# Patient Record
Sex: FEMALE | Race: WHITE | NOT HISPANIC OR LATINO | URBAN - METROPOLITAN AREA
[De-identification: names, ages, dates, MRNs, and addresses within clinical notes are randomized per-mention and may not be internally consistent; named-entity substitution may affect disease eponyms.]

---

## 2017-04-07 ENCOUNTER — INPATIENT (INPATIENT)
Facility: HOSPITAL | Age: 67
LOS: 4 days | Discharge: NURSING, REHAB OR LONG-TERM CARE | DRG: 194 | End: 2017-04-12
Attending: INTERNAL MEDICINE | Admitting: INTERNAL MEDICINE
Payer: MEDICARE

## 2017-04-07 VITALS
RESPIRATION RATE: 16 BRPM | HEIGHT: 62 IN | HEART RATE: 78 BPM | OXYGEN SATURATION: 98 % | SYSTOLIC BLOOD PRESSURE: 127 MMHG | DIASTOLIC BLOOD PRESSURE: 78 MMHG | WEIGHT: 139.99 LBS

## 2017-04-07 DIAGNOSIS — N30.00 ACUTE CYSTITIS WITHOUT HEMATURIA: ICD-10-CM

## 2017-04-07 DIAGNOSIS — I21.4 NON-ST ELEVATION (NSTEMI) MYOCARDIAL INFARCTION: ICD-10-CM

## 2017-04-07 DIAGNOSIS — J44.9 CHRONIC OBSTRUCTIVE PULMONARY DISEASE, UNSPECIFIED: ICD-10-CM

## 2017-04-07 DIAGNOSIS — Z29.9 ENCOUNTER FOR PROPHYLACTIC MEASURES, UNSPECIFIED: ICD-10-CM

## 2017-04-07 DIAGNOSIS — J18.1 LOBAR PNEUMONIA, UNSPECIFIED ORGANISM: ICD-10-CM

## 2017-04-07 DIAGNOSIS — E03.9 HYPOTHYROIDISM, UNSPECIFIED: ICD-10-CM

## 2017-04-07 DIAGNOSIS — F20.9 SCHIZOPHRENIA, UNSPECIFIED: ICD-10-CM

## 2017-04-07 LAB
ALBUMIN SERPL ELPH-MCNC: 2.7 G/DL — LOW (ref 3.5–5)
ALLERGY+IMMUNOLOGY DIAG STUDY NOTE: SIGNIFICANT CHANGE UP
ALP SERPL-CCNC: 59 U/L — SIGNIFICANT CHANGE UP (ref 40–120)
ALT FLD-CCNC: 22 U/L DA — SIGNIFICANT CHANGE UP (ref 10–60)
ANION GAP SERPL CALC-SCNC: 8 MMOL/L — SIGNIFICANT CHANGE UP (ref 5–17)
APPEARANCE UR: ABNORMAL
APTT BLD: 40.2 SEC — HIGH (ref 27.5–37.4)
AST SERPL-CCNC: 33 U/L — SIGNIFICANT CHANGE UP (ref 10–40)
BASOPHILS # BLD AUTO: 0.1 K/UL — SIGNIFICANT CHANGE UP (ref 0–0.2)
BASOPHILS NFR BLD AUTO: 1.8 % — SIGNIFICANT CHANGE UP (ref 0–2)
BILIRUB SERPL-MCNC: 0.2 MG/DL — SIGNIFICANT CHANGE UP (ref 0.2–1.2)
BILIRUB UR-MCNC: NEGATIVE — SIGNIFICANT CHANGE UP
BUN SERPL-MCNC: 20 MG/DL — HIGH (ref 7–18)
CALCIUM SERPL-MCNC: 8.1 MG/DL — LOW (ref 8.4–10.5)
CHLORIDE SERPL-SCNC: 109 MMOL/L — HIGH (ref 96–108)
CO2 SERPL-SCNC: 27 MMOL/L — SIGNIFICANT CHANGE UP (ref 22–31)
COLOR SPEC: YELLOW — SIGNIFICANT CHANGE UP
CREAT SERPL-MCNC: 0.73 MG/DL — SIGNIFICANT CHANGE UP (ref 0.5–1.3)
DIFF PNL FLD: ABNORMAL
EOSINOPHIL # BLD AUTO: 0.1 K/UL — SIGNIFICANT CHANGE UP (ref 0–0.5)
EOSINOPHIL NFR BLD AUTO: 1.3 % — SIGNIFICANT CHANGE UP (ref 0–6)
FLUBV RNA SPEC QL NAA+PROBE: DETECTED
GLUCOSE SERPL-MCNC: 105 MG/DL — HIGH (ref 70–99)
GLUCOSE UR QL: NEGATIVE — SIGNIFICANT CHANGE UP
HCT VFR BLD CALC: 34.2 % — LOW (ref 34.5–45)
HGB BLD-MCNC: 11.9 G/DL — SIGNIFICANT CHANGE UP (ref 11.5–15.5)
INR BLD: 1.25 RATIO — HIGH (ref 0.88–1.16)
KETONES UR-MCNC: ABNORMAL
LACTATE SERPL-SCNC: 1.5 MMOL/L — SIGNIFICANT CHANGE UP (ref 0.7–2)
LEUKOCYTE ESTERASE UR-ACNC: ABNORMAL
LYMPHOCYTES # BLD AUTO: 1.1 K/UL — SIGNIFICANT CHANGE UP (ref 1–3.3)
LYMPHOCYTES # BLD AUTO: 15.7 % — SIGNIFICANT CHANGE UP (ref 13–44)
MCHC RBC-ENTMCNC: 32.6 PG — SIGNIFICANT CHANGE UP (ref 27–34)
MCHC RBC-ENTMCNC: 34.8 GM/DL — SIGNIFICANT CHANGE UP (ref 32–36)
MCV RBC AUTO: 93.7 FL — SIGNIFICANT CHANGE UP (ref 80–100)
MONOCYTES # BLD AUTO: 1.1 K/UL — HIGH (ref 0–0.9)
MONOCYTES NFR BLD AUTO: 15.5 % — HIGH (ref 2–14)
NEUTROPHILS # BLD AUTO: 4.7 K/UL — SIGNIFICANT CHANGE UP (ref 1.8–7.4)
NEUTROPHILS NFR BLD AUTO: 65.8 % — SIGNIFICANT CHANGE UP (ref 43–77)
NITRITE UR-MCNC: POSITIVE
PH UR: 6 — SIGNIFICANT CHANGE UP (ref 4.8–8)
PLATELET # BLD AUTO: 174 K/UL — SIGNIFICANT CHANGE UP (ref 150–400)
POTASSIUM SERPL-MCNC: 4.2 MMOL/L — SIGNIFICANT CHANGE UP (ref 3.5–5.3)
POTASSIUM SERPL-SCNC: 4.2 MMOL/L — SIGNIFICANT CHANGE UP (ref 3.5–5.3)
PROT SERPL-MCNC: 6.5 G/DL — SIGNIFICANT CHANGE UP (ref 6–8.3)
PROT UR-MCNC: 100
PROTHROM AB SERPL-ACNC: 13.7 SEC — HIGH (ref 9.8–12.7)
RAPID RVP RESULT: DETECTED
RBC # BLD: 3.65 M/UL — LOW (ref 3.8–5.2)
RBC # FLD: 13.1 % — SIGNIFICANT CHANGE UP (ref 10.3–14.5)
SODIUM SERPL-SCNC: 144 MMOL/L — SIGNIFICANT CHANGE UP (ref 135–145)
SP GR SPEC: 1.02 — SIGNIFICANT CHANGE UP (ref 1.01–1.02)
UROBILINOGEN FLD QL: NEGATIVE — SIGNIFICANT CHANGE UP
WBC # BLD: 7.1 K/UL — SIGNIFICANT CHANGE UP (ref 3.8–10.5)
WBC # FLD AUTO: 7.1 K/UL — SIGNIFICANT CHANGE UP (ref 3.8–10.5)

## 2017-04-07 PROCEDURE — 71010: CPT | Mod: 26

## 2017-04-07 PROCEDURE — 99285 EMERGENCY DEPT VISIT HI MDM: CPT

## 2017-04-07 RX ORDER — ACETAMINOPHEN 500 MG
650 TABLET ORAL ONCE
Qty: 0 | Refills: 0 | Status: COMPLETED | OUTPATIENT
Start: 2017-04-07 | End: 2017-04-07

## 2017-04-07 RX ORDER — AZITHROMYCIN 500 MG/1
500 TABLET, FILM COATED ORAL ONCE
Qty: 0 | Refills: 0 | Status: COMPLETED | OUTPATIENT
Start: 2017-04-07 | End: 2017-04-07

## 2017-04-07 RX ORDER — HEPARIN SODIUM 5000 [USP'U]/ML
INJECTION INTRAVENOUS; SUBCUTANEOUS
Qty: 25000 | Refills: 0 | Status: DISCONTINUED | OUTPATIENT
Start: 2017-04-07 | End: 2017-04-08

## 2017-04-07 RX ORDER — SODIUM CHLORIDE 9 MG/ML
1000 INJECTION INTRAMUSCULAR; INTRAVENOUS; SUBCUTANEOUS
Qty: 0 | Refills: 0 | Status: DISCONTINUED | OUTPATIENT
Start: 2017-04-07 | End: 2017-04-08

## 2017-04-07 RX ORDER — HEPARIN SODIUM 5000 [USP'U]/ML
3800 INJECTION INTRAVENOUS; SUBCUTANEOUS ONCE
Qty: 0 | Refills: 0 | Status: COMPLETED | OUTPATIENT
Start: 2017-04-07 | End: 2017-04-07

## 2017-04-07 RX ORDER — ATORVASTATIN CALCIUM 80 MG/1
40 TABLET, FILM COATED ORAL AT BEDTIME
Qty: 0 | Refills: 0 | Status: DISCONTINUED | OUTPATIENT
Start: 2017-04-07 | End: 2017-04-12

## 2017-04-07 RX ORDER — ASPIRIN/CALCIUM CARB/MAGNESIUM 324 MG
81 TABLET ORAL DAILY
Qty: 0 | Refills: 0 | Status: DISCONTINUED | OUTPATIENT
Start: 2017-04-07 | End: 2017-04-12

## 2017-04-07 RX ORDER — DIVALPROEX SODIUM 500 MG/1
1500 TABLET, DELAYED RELEASE ORAL DAILY
Qty: 0 | Refills: 0 | Status: DISCONTINUED | OUTPATIENT
Start: 2017-04-07 | End: 2017-04-08

## 2017-04-07 RX ORDER — MAGNESIUM HYDROXIDE 400 MG/1
15 TABLET, CHEWABLE ORAL AT BEDTIME
Qty: 0 | Refills: 0 | Status: DISCONTINUED | OUTPATIENT
Start: 2017-04-07 | End: 2017-04-12

## 2017-04-07 RX ORDER — CEFTRIAXONE 500 MG/1
1 INJECTION, POWDER, FOR SOLUTION INTRAMUSCULAR; INTRAVENOUS ONCE
Qty: 0 | Refills: 0 | Status: COMPLETED | OUTPATIENT
Start: 2017-04-07 | End: 2017-04-07

## 2017-04-07 RX ORDER — SIMVASTATIN 20 MG/1
20 TABLET, FILM COATED ORAL AT BEDTIME
Qty: 0 | Refills: 0 | Status: DISCONTINUED | OUTPATIENT
Start: 2017-04-07 | End: 2017-04-07

## 2017-04-07 RX ORDER — SODIUM CHLORIDE 9 MG/ML
1000 INJECTION, SOLUTION INTRAVENOUS
Qty: 0 | Refills: 0 | Status: DISCONTINUED | OUTPATIENT
Start: 2017-04-07 | End: 2017-04-07

## 2017-04-07 RX ORDER — OLANZAPINE 15 MG/1
1 TABLET, FILM COATED ORAL
Qty: 0 | Refills: 0 | COMMUNITY

## 2017-04-07 RX ORDER — PIPERACILLIN AND TAZOBACTAM 4; .5 G/20ML; G/20ML
3.38 INJECTION, POWDER, LYOPHILIZED, FOR SOLUTION INTRAVENOUS EVERY 8 HOURS
Qty: 0 | Refills: 0 | Status: DISCONTINUED | OUTPATIENT
Start: 2017-04-07 | End: 2017-04-12

## 2017-04-07 RX ORDER — DIPHENHYDRAMINE HCL 50 MG
25 CAPSULE ORAL ONCE
Qty: 0 | Refills: 0 | Status: COMPLETED | OUTPATIENT
Start: 2017-04-07 | End: 2017-04-07

## 2017-04-07 RX ORDER — METOPROLOL TARTRATE 50 MG
12.5 TABLET ORAL
Qty: 0 | Refills: 0 | Status: DISCONTINUED | OUTPATIENT
Start: 2017-04-07 | End: 2017-04-08

## 2017-04-07 RX ORDER — LEVOTHYROXINE SODIUM 125 MCG
50 TABLET ORAL DAILY
Qty: 0 | Refills: 0 | Status: DISCONTINUED | OUTPATIENT
Start: 2017-04-07 | End: 2017-04-12

## 2017-04-07 RX ORDER — OLANZAPINE 15 MG/1
0.5 TABLET, FILM COATED ORAL
Qty: 0 | Refills: 0 | COMMUNITY

## 2017-04-07 RX ORDER — OLANZAPINE 15 MG/1
10 TABLET, FILM COATED ORAL AT BEDTIME
Qty: 0 | Refills: 0 | Status: DISCONTINUED | OUTPATIENT
Start: 2017-04-07 | End: 2017-04-08

## 2017-04-07 RX ORDER — CLONAZEPAM 1 MG
1 TABLET ORAL THREE TIMES A DAY
Qty: 0 | Refills: 0 | Status: DISCONTINUED | OUTPATIENT
Start: 2017-04-07 | End: 2017-04-12

## 2017-04-07 RX ORDER — SODIUM CHLORIDE 9 MG/ML
1000 INJECTION INTRAMUSCULAR; INTRAVENOUS; SUBCUTANEOUS ONCE
Qty: 0 | Refills: 0 | Status: COMPLETED | OUTPATIENT
Start: 2017-04-07 | End: 2017-04-07

## 2017-04-07 RX ORDER — HEPARIN SODIUM 5000 [USP'U]/ML
3800 INJECTION INTRAVENOUS; SUBCUTANEOUS EVERY 6 HOURS
Qty: 0 | Refills: 0 | Status: DISCONTINUED | OUTPATIENT
Start: 2017-04-07 | End: 2017-04-09

## 2017-04-07 RX ORDER — IPRATROPIUM/ALBUTEROL SULFATE 18-103MCG
3 AEROSOL WITH ADAPTER (GRAM) INHALATION EVERY 6 HOURS
Qty: 0 | Refills: 0 | Status: DISCONTINUED | OUTPATIENT
Start: 2017-04-07 | End: 2017-04-12

## 2017-04-07 RX ORDER — VANCOMYCIN HCL 1 G
1000 VIAL (EA) INTRAVENOUS ONCE
Qty: 0 | Refills: 0 | Status: COMPLETED | OUTPATIENT
Start: 2017-04-07 | End: 2017-04-07

## 2017-04-07 RX ORDER — ACETAMINOPHEN 500 MG
650 TABLET ORAL EVERY 6 HOURS
Qty: 0 | Refills: 0 | Status: DISCONTINUED | OUTPATIENT
Start: 2017-04-07 | End: 2017-04-12

## 2017-04-07 RX ORDER — LACTULOSE 10 G/15ML
20 SOLUTION ORAL DAILY
Qty: 0 | Refills: 0 | Status: DISCONTINUED | OUTPATIENT
Start: 2017-04-07 | End: 2017-04-12

## 2017-04-07 RX ADMIN — AZITHROMYCIN 250 MILLIGRAM(S): 500 TABLET, FILM COATED ORAL at 20:47

## 2017-04-07 RX ADMIN — Medication 650 MILLIGRAM(S): at 19:33

## 2017-04-07 RX ADMIN — SODIUM CHLORIDE 250 MILLILITER(S): 9 INJECTION INTRAMUSCULAR; INTRAVENOUS; SUBCUTANEOUS at 18:59

## 2017-04-07 RX ADMIN — CEFTRIAXONE 100 GRAM(S): 500 INJECTION, POWDER, FOR SOLUTION INTRAMUSCULAR; INTRAVENOUS at 19:52

## 2017-04-07 RX ADMIN — Medication 25 MILLIGRAM(S): at 20:12

## 2017-04-07 RX ADMIN — Medication 250 MILLIGRAM(S): at 21:26

## 2017-04-07 RX ADMIN — HEPARIN SODIUM 3800 UNIT(S): 5000 INJECTION INTRAVENOUS; SUBCUTANEOUS at 21:49

## 2017-04-07 RX ADMIN — HEPARIN SODIUM 750 UNIT(S)/HR: 5000 INJECTION INTRAVENOUS; SUBCUTANEOUS at 21:49

## 2017-04-07 NOTE — ED ADULT NURSE NOTE - ED STAT RN HANDOFF DETAILS
pt is alert awake confused and agitated no acute respiratory distress noted endorsed to Chip RN in ED

## 2017-04-07 NOTE — ED PROVIDER NOTE - MEDICAL DECISION MAKING DETAILS
67 y/o F with multiple medical problems, sent here from [facility], here with fever 102 degrees Farenheit rectally. Will do infectious work up. Most likely to admit.

## 2017-04-07 NOTE — ED PROVIDER NOTE - CARE PLAN
Principal Discharge DX:	Pneumonia of right lower lobe due to infectious organism  Secondary Diagnosis:	Acute cystitis without hematuria

## 2017-04-07 NOTE — H&P ADULT. - NEGATIVE NEUROLOGICAL SYMPTOMS
no syncope/no tremors/no vertigo/no generalized seizures/no loss of sensation/no weakness/no loss of consciousness/no transient paralysis

## 2017-04-07 NOTE — ED PROVIDER NOTE - PMH
Anemia    Bipolar Disorder    COPD (chronic obstructive pulmonary disease)    GERD (gastroesophageal reflux disease)    Hypercholesteremia    Hypothyroidism    Osteopenia    Pacemaker    Paranoia  CHRONIC  Schizophrenia

## 2017-04-07 NOTE — ED PROVIDER NOTE - OBJECTIVE STATEMENT
65 y/o F with PMHx of anemia, bipolar disorder, COPD, GERD, HLD, hypothyroidism, osteopenia, pacemaker, chronic paranoia, and schizophrenia, sent from [facility]  for fever and chest pain, although pt denies chest pain in ED. Pt is a poor historian; pt is able to answer yes or no. Pt denies chills, chest pain, trauma, or any other complaints. NKDA. 67 y/o F with PMHx of anemia, bipolar disorder, COPD, GERD, HLD, hypothyroidism, osteopenia, pacemaker, chronic paranoia, and schizophrenia, sent from [facility]  for complaints of fever and chest pain, although pt denies chest pain in ED. Pt is a poor historian; pt is able to answer yes or no. Pt denies chills, chest pain, trauma, nausea, vomiting, diarrhea, abd pain, weakness or any other complaints. Pt continues to report she wants to go home but has multiple psych disorders and not able to make decisions on her own.

## 2017-04-07 NOTE — ED ADULT NURSE NOTE - ED STAT RN HANDOFF DETAILS 2
pt endorsed by SREE ZHENG in stable condition , two IV accesses - right and left arms 20 gauge with NS running

## 2017-04-07 NOTE — H&P ADULT. - PROBLEM SELECTOR PLAN 5
Pt is on olanzapine bid, klonopin and divalproex  QTc is prolonged 592 msec, but pt is very agitated and yelling sawing she wants to go home, giving bedtime doses and will call Psych consult in AM to adjust medications.

## 2017-04-07 NOTE — H&P ADULT. - RS GEN PE MLT RESP DETAILS PC
no rhonchi/no chest wall tenderness/normal/no wheezes/breath sounds equal/clear to auscultation bilaterally/no rales

## 2017-04-07 NOTE — H&P ADULT. - HISTORY OF PRESENT ILLNESS
65 y/o F with PMHx of anemia, bipolar disorder, COPD, GERD, HLD, hypothyroidism, osteopenia, pacemaker, chronic paranoia, and schizophrenia, sent from [facility]  for complaints of fever and chest pain, although pt denies chest pain in ED. Pt is a poor historian; pt is able to answer yes or no. Pt denies chills, chest pain, trauma, nausea, vomiting, diarrhea, abd pain, weakness or any other complaints. Pt continues to report she wants to go home but has multiple psych disorders and not able to make decisions on her own. 67 y/o F with PMHx of anemia, bipolar disorder, COPD, GERD, HLD, hypothyroidism, osteopenia, pacemaker, chronic paranoia, and schizophrenia, sent from [facility]  for complaints of fever and chest pain, although pt denies chest pain in ED. Pt is a poor historian; pt is able to answer yes or no. Pt denies chills, trauma, nausea, vomiting, diarrhea, abd pain, weakness or any other complaints. Pt continues to report she wants to go home but has multiple psych disorders and not able to make decisions on her own. 65 y/o F from [facility] with PMHx of anemia, bipolar disorder, COPD, GERD, HLD, hypothyroidism, osteopenia, pacemaker, chronic paranoia, and schizophrenia, Iron deificiency Anemia, depression, Dementia with behavioural problem, sent from [facility]  for complaints of fever, cough and chest pain, although pt denies chest pain in ED. Pt is a poor historian; pt is able to answer yes or no. Pt denies chest pain, SOB, chills, trauma, nausea, vomiting, diarrhea, abd pain, weakness or any other complaints currently. Pt continues to report she wants to go home but has multiple psych disorders and not able to make decisions on her own.  Pt is full code.

## 2017-04-07 NOTE — ED ADULT NURSE NOTE - ED STAT RN HANDOFF DETAILS 3
received pt on stretcher no respiratory distress mittens in place for safety, IV fluid in progress, admitted awaiting for bed assignment, monitoring continues.

## 2017-04-07 NOTE — ED PROVIDER NOTE - PHYSICAL EXAMINATION
GENERAL: No acute distress, non toxic  HEAD: Atraumatic, normocephalic  EARS: Externally normal, atraumatic, TMs normal bilaterally  EYES: No jaundice, not injected, no rupture, no foreign bodies  MOUTH: Moist mucous membranes, no open lesion, uvula midline without edema, no exudates, no peritonsilar abscess bilaterally.  NECK: Supple, full range of motion, no swelling, no lymphadenopathy  HEART: Regular rate and rhythm, no murmurs, no rubs, no gallops  LUNGS: Clear to auscultation bilaterally without rhonci, rales, or wheezing  ABDOMEN: Soft and non tender in all 4 quadrants, normal bowel sounds, no signs of trauma, no costovertebral tenderness bilaterally  BACK/SPINE: Non tender spine in cervical/thoracic/lumbar regions, no stepoffs palpable  EXTREMITIES: No gross deformities  VASCULAR: Pulses palpable in all extremities, no pitting edema, capillary refill <2 secs  SKIN: Grossly intact without rash or open wounds  PSYCH: Alert and oriented x 3  GAIT: Normal without need for assistance GENERAL: No acute distress, non toxic  HEAD: Atraumatic, normocephalic  EARS: Externally normal, atraumatic  EYES: No jaundice, not injected, no rupture, no foreign bodies  MOUTH: Moist mucous membranes, no open lesion, uvula midline without edema, no exudates, no peritonsilar abscess bilaterally.  NECK: Supple, full range of motion, no swelling, no lymphadenopathy  HEART: Regular rate and rhythm, no murmurs, no rubs, no gallops  LUNGS: Clear to auscultation bilaterally without rhonci, rales, or wheezing  ABDOMEN: Soft and non tender in all 4 quadrants, normal bowel sounds, no signs of trauma, no costovertebral tenderness bilaterally  BACK/SPINE: Non tender spine in cervical/thoracic/lumbar regions, no stepoffs palpable  EXTREMITIES: No gross deformities  VASCULAR: Pulses palpable in all extremities, no pitting edema, capillary refill <2 secs  SKIN: Grossly intact without rash or open wounds  PSYCH: Alert and oriented x 2

## 2017-04-07 NOTE — H&P ADULT. - PROBLEM SELECTOR PLAN 2
Pt has Chest pain, resolved currently likely due to NSTEMI  as T1 elevated 0.116 and risk factors.  EKG Sinus rhythm with first degree AV block @70 bpm with QTc prolonged 590 msec  Heparin drip started in ED as per Attending  f/u T2 and f/u T3   Continue metoprolol, aspirin and statin.  Follow ECHO   Cardiology Consult: Dr Ponce consulted

## 2017-04-07 NOTE — ED ADULT NURSE NOTE - OBJECTIVE STATEMENT
pt biba from NH c/o of chest pain pt is alert awake oriented to self only denies any chest pain at this time pt is agitated yelling out "I want to go back to NH" core temp 102.6 no acute respiratory distress noted

## 2017-04-07 NOTE — ED PROVIDER NOTE - PROGRESS NOTE DETAILS
Pt is trying to pull out her IV lines, will place mittens. Tried to talk to pt but continues to report she wants to go home and trying to pull out lines despite me telling her that she has both UTI and PNA. POS Trop, explained to pt. Spoke to Dr Pineda, he will speak to cardiology. Will start heparin drip in ED. MAR made aware

## 2017-04-07 NOTE — ED PROVIDER NOTE - NS ED MD SCRIBE ATTENDING SCRIBE SECTIONS
REVIEW OF SYSTEMS/VITAL SIGNS( Pullset)/PAST MEDICAL/SURGICAL/SOCIAL HISTORY/HIV/HISTORY OF PRESENT ILLNESS/DISPOSITION/PHYSICAL EXAM

## 2017-04-07 NOTE — ED PROVIDER NOTE - DIAGNOSIS COUNSELING, MDM
conducted a detailed discussion... I had a detailed discussion with the patient and/or guardian regarding the historical points, exam findings, and any diagnostic results supporting the discharge/admit diagnosis. I had a detailed discussion with the patient and/or guardian regarding the historical points, exam findings, and any diagnostic results supporting the admit diagnosis.

## 2017-04-07 NOTE — H&P ADULT. - ASSESSMENT
65 y/o F from [facility] with PMHx of anemia, bipolar disorder, COPD, GERD, HLD, hypothyroidism, osteopenia, pacemaker, chronic paranoia, and schizophrenia, Iron deificiency Anemia, depression, Dementia with behavioural problem, sent from [facility]  for complaints of fever, cough and chest pain, although pt denies chest pain in ED. Pt is a poor historian; pt is able to answer yes or no. Pt denies chest pain, SOB, chills, trauma, nausea, vomiting, diarrhea, abd pain, weakness or any other complaints currently. Pt continues to report she wants to go home but has multiple psych disorders and not able to make decisions on her own.  Pt is full code.

## 2017-04-07 NOTE — H&P ADULT. - PROBLEM SELECTOR PLAN 1
Pt has Fever 101 F, productive cough and CXR showed RLL opacity due to HCAP as from Nursing Home with fever.  will give Zosyn  ID Dr. Segovia consulted  f/u blood cx and urine cx  f/u RVP  duonebs

## 2017-04-08 LAB
ANION GAP SERPL CALC-SCNC: 7 MMOL/L — SIGNIFICANT CHANGE UP (ref 5–17)
APTT BLD: 71.9 SEC — HIGH (ref 27.5–37.4)
BUN SERPL-MCNC: 11 MG/DL — SIGNIFICANT CHANGE UP (ref 7–18)
CALCIUM SERPL-MCNC: 7.2 MG/DL — LOW (ref 8.4–10.5)
CHLORIDE SERPL-SCNC: 116 MMOL/L — HIGH (ref 96–108)
CHOLEST SERPL-MCNC: 86 MG/DL — SIGNIFICANT CHANGE UP (ref 10–199)
CK MB BLD-MCNC: 0.7 % — SIGNIFICANT CHANGE UP (ref 0–3.5)
CK MB BLD-MCNC: 0.8 % — SIGNIFICANT CHANGE UP (ref 0–3.5)
CK MB CFR SERPL CALC: 7.3 NG/ML — HIGH (ref 0–3.6)
CK MB CFR SERPL CALC: 8.1 NG/ML — HIGH (ref 0–3.6)
CK SERPL-CCNC: 981 U/L — HIGH (ref 21–215)
CK SERPL-CCNC: 999 U/L — HIGH (ref 21–215)
CO2 SERPL-SCNC: 24 MMOL/L — SIGNIFICANT CHANGE UP (ref 22–31)
CREAT SERPL-MCNC: 0.6 MG/DL — SIGNIFICANT CHANGE UP (ref 0.5–1.3)
CULTURE RESULTS: NO GROWTH — SIGNIFICANT CHANGE UP
FOLATE SERPL-MCNC: 13.7 NG/ML — SIGNIFICANT CHANGE UP (ref 4.8–24.2)
GLUCOSE SERPL-MCNC: 85 MG/DL — SIGNIFICANT CHANGE UP (ref 70–99)
HBA1C BLD-MCNC: 5.3 % — SIGNIFICANT CHANGE UP (ref 4–5.6)
HCT VFR BLD CALC: 30.4 % — LOW (ref 34.5–45)
HDLC SERPL-MCNC: 42 MG/DL — SIGNIFICANT CHANGE UP (ref 40–125)
HGB BLD-MCNC: 10.2 G/DL — LOW (ref 11.5–15.5)
LIPID PNL WITH DIRECT LDL SERPL: 24 MG/DL — SIGNIFICANT CHANGE UP
MAGNESIUM SERPL-MCNC: 1.7 MG/DL — LOW (ref 1.8–2.4)
MCHC RBC-ENTMCNC: 31.6 PG — SIGNIFICANT CHANGE UP (ref 27–34)
MCHC RBC-ENTMCNC: 33.6 GM/DL — SIGNIFICANT CHANGE UP (ref 32–36)
MCV RBC AUTO: 94.1 FL — SIGNIFICANT CHANGE UP (ref 80–100)
PHOSPHATE SERPL-MCNC: 2.7 MG/DL — SIGNIFICANT CHANGE UP (ref 2.5–4.5)
PLATELET # BLD AUTO: 150 K/UL — SIGNIFICANT CHANGE UP (ref 150–400)
POTASSIUM SERPL-MCNC: 3.8 MMOL/L — SIGNIFICANT CHANGE UP (ref 3.5–5.3)
POTASSIUM SERPL-SCNC: 3.8 MMOL/L — SIGNIFICANT CHANGE UP (ref 3.5–5.3)
RBC # BLD: 3.24 M/UL — LOW (ref 3.8–5.2)
RBC # FLD: 13.1 % — SIGNIFICANT CHANGE UP (ref 10.3–14.5)
SODIUM SERPL-SCNC: 147 MMOL/L — HIGH (ref 135–145)
SPECIMEN SOURCE: SIGNIFICANT CHANGE UP
TOTAL CHOLESTEROL/HDL RATIO MEASUREMENT: 2 RATIO — LOW (ref 3.3–7.1)
TRIGL SERPL-MCNC: 99 MG/DL — SIGNIFICANT CHANGE UP (ref 10–149)
TROPONIN I SERPL-MCNC: 0.02 NG/ML — SIGNIFICANT CHANGE UP (ref 0–0.04)
TROPONIN I SERPL-MCNC: <0.015 NG/ML — SIGNIFICANT CHANGE UP (ref 0–0.04)
VIT B12 SERPL-MCNC: 650 PG/ML — SIGNIFICANT CHANGE UP (ref 243–894)
WBC # BLD: 10.4 K/UL — SIGNIFICANT CHANGE UP (ref 3.8–10.5)
WBC # FLD AUTO: 10.4 K/UL — SIGNIFICANT CHANGE UP (ref 3.8–10.5)

## 2017-04-08 RX ORDER — VANCOMYCIN HCL 1 G
1000 VIAL (EA) INTRAVENOUS EVERY 12 HOURS
Qty: 0 | Refills: 0 | Status: DISCONTINUED | OUTPATIENT
Start: 2017-04-08 | End: 2017-04-08

## 2017-04-08 RX ORDER — VALPROIC ACID (AS SODIUM SALT) 250 MG/5ML
500 SOLUTION, ORAL ORAL EVERY 8 HOURS
Qty: 0 | Refills: 0 | Status: DISCONTINUED | OUTPATIENT
Start: 2017-04-08 | End: 2017-04-12

## 2017-04-08 RX ORDER — SODIUM CHLORIDE 9 MG/ML
1000 INJECTION INTRAMUSCULAR; INTRAVENOUS; SUBCUTANEOUS
Qty: 0 | Refills: 0 | Status: DISCONTINUED | OUTPATIENT
Start: 2017-04-08 | End: 2017-04-12

## 2017-04-08 RX ORDER — SODIUM CHLORIDE 9 MG/ML
1000 INJECTION INTRAMUSCULAR; INTRAVENOUS; SUBCUTANEOUS ONCE
Qty: 0 | Refills: 0 | Status: COMPLETED | OUTPATIENT
Start: 2017-04-08 | End: 2017-04-08

## 2017-04-08 RX ORDER — HEPARIN SODIUM 5000 [USP'U]/ML
5000 INJECTION INTRAVENOUS; SUBCUTANEOUS EVERY 8 HOURS
Qty: 0 | Refills: 0 | Status: DISCONTINUED | OUTPATIENT
Start: 2017-04-08 | End: 2017-04-12

## 2017-04-08 RX ORDER — ACETYLCYSTEINE 200 MG/ML
4 VIAL (ML) MISCELLANEOUS ONCE
Qty: 0 | Refills: 0 | Status: COMPLETED | OUTPATIENT
Start: 2017-04-08 | End: 2017-04-08

## 2017-04-08 RX ORDER — TRAZODONE HCL 50 MG
50 TABLET ORAL THREE TIMES A DAY
Qty: 0 | Refills: 0 | Status: DISCONTINUED | OUTPATIENT
Start: 2017-04-08 | End: 2017-04-12

## 2017-04-08 RX ADMIN — Medication 650 MILLIGRAM(S): at 02:16

## 2017-04-08 RX ADMIN — HEPARIN SODIUM 5000 UNIT(S): 5000 INJECTION INTRAVENOUS; SUBCUTANEOUS at 13:03

## 2017-04-08 RX ADMIN — Medication 50 MILLIGRAM(S): at 22:55

## 2017-04-08 RX ADMIN — PIPERACILLIN AND TAZOBACTAM 25 GRAM(S): 4; .5 INJECTION, POWDER, LYOPHILIZED, FOR SOLUTION INTRAVENOUS at 22:00

## 2017-04-08 RX ADMIN — HEPARIN SODIUM 5000 UNIT(S): 5000 INJECTION INTRAVENOUS; SUBCUTANEOUS at 22:55

## 2017-04-08 RX ADMIN — Medication 3 MILLILITER(S): at 11:23

## 2017-04-08 RX ADMIN — Medication 4 MILLILITER(S): at 13:03

## 2017-04-08 RX ADMIN — SODIUM CHLORIDE 2000 MILLILITER(S): 9 INJECTION INTRAMUSCULAR; INTRAVENOUS; SUBCUTANEOUS at 11:23

## 2017-04-08 RX ADMIN — Medication 1 MILLIGRAM(S): at 00:42

## 2017-04-08 RX ADMIN — Medication 3 MILLILITER(S): at 18:39

## 2017-04-08 RX ADMIN — Medication 0.25 MILLIGRAM(S): at 13:02

## 2017-04-08 RX ADMIN — SODIUM CHLORIDE 60 MILLILITER(S): 9 INJECTION INTRAMUSCULAR; INTRAVENOUS; SUBCUTANEOUS at 01:26

## 2017-04-08 RX ADMIN — ATORVASTATIN CALCIUM 40 MILLIGRAM(S): 80 TABLET, FILM COATED ORAL at 22:55

## 2017-04-08 RX ADMIN — SODIUM CHLORIDE 1000 MILLILITER(S): 9 INJECTION INTRAMUSCULAR; INTRAVENOUS; SUBCUTANEOUS at 06:34

## 2017-04-08 RX ADMIN — PIPERACILLIN AND TAZOBACTAM 25 GRAM(S): 4; .5 INJECTION, POWDER, LYOPHILIZED, FOR SOLUTION INTRAVENOUS at 13:04

## 2017-04-08 RX ADMIN — SODIUM CHLORIDE 100 MILLILITER(S): 9 INJECTION INTRAMUSCULAR; INTRAVENOUS; SUBCUTANEOUS at 05:43

## 2017-04-08 RX ADMIN — Medication 1 MILLIGRAM(S): at 22:55

## 2017-04-08 RX ADMIN — Medication 32.5 MILLIGRAM(S): at 13:03

## 2017-04-08 RX ADMIN — PIPERACILLIN AND TAZOBACTAM 25 GRAM(S): 4; .5 INJECTION, POWDER, LYOPHILIZED, FOR SOLUTION INTRAVENOUS at 06:33

## 2017-04-08 RX ADMIN — Medication 3 MILLILITER(S): at 03:46

## 2017-04-08 RX ADMIN — SODIUM CHLORIDE 100 MILLILITER(S): 9 INJECTION INTRAMUSCULAR; INTRAVENOUS; SUBCUTANEOUS at 18:40

## 2017-04-08 RX ADMIN — SODIUM CHLORIDE 100 MILLILITER(S): 9 INJECTION INTRAMUSCULAR; INTRAVENOUS; SUBCUTANEOUS at 13:04

## 2017-04-08 RX ADMIN — SODIUM CHLORIDE 100 MILLILITER(S): 9 INJECTION INTRAMUSCULAR; INTRAVENOUS; SUBCUTANEOUS at 23:21

## 2017-04-08 RX ADMIN — SODIUM CHLORIDE 1000 MILLILITER(S): 9 INJECTION INTRAMUSCULAR; INTRAVENOUS; SUBCUTANEOUS at 07:31

## 2017-04-08 NOTE — ED ADULT NURSE REASSESSMENT NOTE - NS ED NURSE REASSESS COMMENT FT1
pt remains in the ED awaiting bed assignment, no respiratory distress, will endorse to oncoming night nurse for continued monitoring.
Pt awake confused, restless. spits out her oral medicine, Dr Curran aware. Medicated as per orders, monitoring continues.
patient remains on the ED awaiting bed assignment, monitoring continues.
pt on the mittens pt was given the medication crushed . pt did not recive the po Depakote dose informed resident MS Baez . pt is off from the heparin drip

## 2017-04-09 LAB
ANION GAP SERPL CALC-SCNC: 7 MMOL/L — SIGNIFICANT CHANGE UP (ref 5–17)
BUN SERPL-MCNC: 8 MG/DL — SIGNIFICANT CHANGE UP (ref 7–18)
CALCIUM SERPL-MCNC: 7.5 MG/DL — LOW (ref 8.4–10.5)
CHLORIDE SERPL-SCNC: 117 MMOL/L — HIGH (ref 96–108)
CO2 SERPL-SCNC: 28 MMOL/L — SIGNIFICANT CHANGE UP (ref 22–31)
CREAT SERPL-MCNC: 0.4 MG/DL — LOW (ref 0.5–1.3)
GLUCOSE SERPL-MCNC: 90 MG/DL — SIGNIFICANT CHANGE UP (ref 70–99)
HCT VFR BLD CALC: 30.2 % — LOW (ref 34.5–45)
HGB BLD-MCNC: 9.5 G/DL — LOW (ref 11.5–15.5)
MAGNESIUM SERPL-MCNC: 2 MG/DL — SIGNIFICANT CHANGE UP (ref 1.8–2.4)
MCHC RBC-ENTMCNC: 30.8 PG — SIGNIFICANT CHANGE UP (ref 27–34)
MCHC RBC-ENTMCNC: 31.6 GM/DL — LOW (ref 32–36)
MCV RBC AUTO: 97.3 FL — SIGNIFICANT CHANGE UP (ref 80–100)
PHOSPHATE SERPL-MCNC: 2.8 MG/DL — SIGNIFICANT CHANGE UP (ref 2.5–4.5)
PLATELET # BLD AUTO: 129 K/UL — LOW (ref 150–400)
POTASSIUM SERPL-MCNC: 3.7 MMOL/L — SIGNIFICANT CHANGE UP (ref 3.5–5.3)
POTASSIUM SERPL-SCNC: 3.7 MMOL/L — SIGNIFICANT CHANGE UP (ref 3.5–5.3)
RBC # BLD: 3.1 M/UL — LOW (ref 3.8–5.2)
RBC # FLD: 13.6 % — SIGNIFICANT CHANGE UP (ref 10.3–14.5)
SODIUM SERPL-SCNC: 152 MMOL/L — HIGH (ref 135–145)
TROPONIN I SERPL-MCNC: <0.015 NG/ML — SIGNIFICANT CHANGE UP (ref 0–0.04)
VALPROATE SERPL-MCNC: 66 UG/ML — SIGNIFICANT CHANGE UP (ref 50–100)
WBC # BLD: 4.6 K/UL — SIGNIFICANT CHANGE UP (ref 3.8–10.5)
WBC # FLD AUTO: 4.6 K/UL — SIGNIFICANT CHANGE UP (ref 3.8–10.5)

## 2017-04-09 RX ADMIN — Medication 3 MILLILITER(S): at 02:23

## 2017-04-09 RX ADMIN — LACTULOSE 20 GRAM(S): 10 SOLUTION ORAL at 12:59

## 2017-04-09 RX ADMIN — Medication 3 MILLILITER(S): at 20:35

## 2017-04-09 RX ADMIN — Medication 100 MILLIGRAM(S): at 18:59

## 2017-04-09 RX ADMIN — HEPARIN SODIUM 5000 UNIT(S): 5000 INJECTION INTRAVENOUS; SUBCUTANEOUS at 23:15

## 2017-04-09 RX ADMIN — Medication 81 MILLIGRAM(S): at 11:47

## 2017-04-09 RX ADMIN — ATORVASTATIN CALCIUM 40 MILLIGRAM(S): 80 TABLET, FILM COATED ORAL at 23:15

## 2017-04-09 RX ADMIN — Medication 50 MILLIGRAM(S): at 23:15

## 2017-04-09 RX ADMIN — Medication 50 MICROGRAM(S): at 06:22

## 2017-04-09 RX ADMIN — Medication 100 MILLIGRAM(S): at 11:46

## 2017-04-09 RX ADMIN — Medication 75 MILLIGRAM(S): at 06:28

## 2017-04-09 RX ADMIN — PIPERACILLIN AND TAZOBACTAM 25 GRAM(S): 4; .5 INJECTION, POWDER, LYOPHILIZED, FOR SOLUTION INTRAVENOUS at 15:23

## 2017-04-09 RX ADMIN — Medication 100 MILLIGRAM(S): at 23:15

## 2017-04-09 RX ADMIN — Medication 3 MILLILITER(S): at 08:09

## 2017-04-09 RX ADMIN — HEPARIN SODIUM 5000 UNIT(S): 5000 INJECTION INTRAVENOUS; SUBCUTANEOUS at 06:22

## 2017-04-09 RX ADMIN — Medication 100 MILLIGRAM(S): at 06:22

## 2017-04-09 RX ADMIN — Medication 32.5 MILLIGRAM(S): at 11:47

## 2017-04-09 RX ADMIN — PIPERACILLIN AND TAZOBACTAM 25 GRAM(S): 4; .5 INJECTION, POWDER, LYOPHILIZED, FOR SOLUTION INTRAVENOUS at 06:27

## 2017-04-09 RX ADMIN — Medication 50 MILLIGRAM(S): at 15:24

## 2017-04-09 RX ADMIN — Medication 1 MILLIGRAM(S): at 15:24

## 2017-04-09 RX ADMIN — HEPARIN SODIUM 5000 UNIT(S): 5000 INJECTION INTRAVENOUS; SUBCUTANEOUS at 15:23

## 2017-04-09 RX ADMIN — Medication 75 MILLIGRAM(S): at 18:59

## 2017-04-09 RX ADMIN — Medication 50 MILLIGRAM(S): at 06:22

## 2017-04-09 RX ADMIN — PIPERACILLIN AND TAZOBACTAM 25 GRAM(S): 4; .5 INJECTION, POWDER, LYOPHILIZED, FOR SOLUTION INTRAVENOUS at 23:15

## 2017-04-09 RX ADMIN — Medication 1 MILLIGRAM(S): at 06:27

## 2017-04-09 RX ADMIN — Medication 1 MILLIGRAM(S): at 23:15

## 2017-04-10 LAB
ANION GAP SERPL CALC-SCNC: 9 MMOL/L — SIGNIFICANT CHANGE UP (ref 5–17)
BASOPHILS # BLD AUTO: 0 K/UL — SIGNIFICANT CHANGE UP (ref 0–0.2)
BASOPHILS NFR BLD AUTO: 0.8 % — SIGNIFICANT CHANGE UP (ref 0–2)
BUN SERPL-MCNC: 8 MG/DL — SIGNIFICANT CHANGE UP (ref 7–18)
CALCIUM SERPL-MCNC: 7.7 MG/DL — LOW (ref 8.4–10.5)
CHLORIDE SERPL-SCNC: 113 MMOL/L — HIGH (ref 96–108)
CO2 SERPL-SCNC: 28 MMOL/L — SIGNIFICANT CHANGE UP (ref 22–31)
CREAT SERPL-MCNC: 0.38 MG/DL — LOW (ref 0.5–1.3)
EOSINOPHIL # BLD AUTO: 0.1 K/UL — SIGNIFICANT CHANGE UP (ref 0–0.5)
EOSINOPHIL NFR BLD AUTO: 2.2 % — SIGNIFICANT CHANGE UP (ref 0–6)
GLUCOSE SERPL-MCNC: 77 MG/DL — SIGNIFICANT CHANGE UP (ref 70–99)
HCT VFR BLD CALC: 29.7 % — LOW (ref 34.5–45)
HGB BLD-MCNC: 9.8 G/DL — LOW (ref 11.5–15.5)
LYMPHOCYTES # BLD AUTO: 1.4 K/UL — SIGNIFICANT CHANGE UP (ref 1–3.3)
LYMPHOCYTES # BLD AUTO: 33.4 % — SIGNIFICANT CHANGE UP (ref 13–44)
MAGNESIUM SERPL-MCNC: 2.2 MG/DL — SIGNIFICANT CHANGE UP (ref 1.8–2.4)
MCHC RBC-ENTMCNC: 31.3 PG — SIGNIFICANT CHANGE UP (ref 27–34)
MCHC RBC-ENTMCNC: 33 GM/DL — SIGNIFICANT CHANGE UP (ref 32–36)
MCV RBC AUTO: 95 FL — SIGNIFICANT CHANGE UP (ref 80–100)
MONOCYTES # BLD AUTO: 0.5 K/UL — SIGNIFICANT CHANGE UP (ref 0–0.9)
MONOCYTES NFR BLD AUTO: 11.2 % — SIGNIFICANT CHANGE UP (ref 2–14)
NEUTROPHILS # BLD AUTO: 2.2 K/UL — SIGNIFICANT CHANGE UP (ref 1.8–7.4)
NEUTROPHILS NFR BLD AUTO: 52.4 % — SIGNIFICANT CHANGE UP (ref 43–77)
PHOSPHATE SERPL-MCNC: 3 MG/DL — SIGNIFICANT CHANGE UP (ref 2.5–4.5)
PLATELET # BLD AUTO: 122 K/UL — LOW (ref 150–400)
POTASSIUM SERPL-MCNC: 3.7 MMOL/L — SIGNIFICANT CHANGE UP (ref 3.5–5.3)
POTASSIUM SERPL-SCNC: 3.7 MMOL/L — SIGNIFICANT CHANGE UP (ref 3.5–5.3)
RBC # BLD: 3.13 M/UL — LOW (ref 3.8–5.2)
RBC # FLD: 13.2 % — SIGNIFICANT CHANGE UP (ref 10.3–14.5)
SODIUM SERPL-SCNC: 150 MMOL/L — HIGH (ref 135–145)
WBC # BLD: 4.1 K/UL — SIGNIFICANT CHANGE UP (ref 3.8–10.5)
WBC # FLD AUTO: 4.1 K/UL — SIGNIFICANT CHANGE UP (ref 3.8–10.5)

## 2017-04-10 RX ADMIN — Medication 27.5 MILLIGRAM(S): at 22:30

## 2017-04-10 RX ADMIN — PIPERACILLIN AND TAZOBACTAM 25 GRAM(S): 4; .5 INJECTION, POWDER, LYOPHILIZED, FOR SOLUTION INTRAVENOUS at 06:30

## 2017-04-10 RX ADMIN — Medication 100 MILLIGRAM(S): at 17:46

## 2017-04-10 RX ADMIN — Medication 27.5 MILLIGRAM(S): at 16:04

## 2017-04-10 RX ADMIN — PIPERACILLIN AND TAZOBACTAM 25 GRAM(S): 4; .5 INJECTION, POWDER, LYOPHILIZED, FOR SOLUTION INTRAVENOUS at 16:03

## 2017-04-10 RX ADMIN — HEPARIN SODIUM 5000 UNIT(S): 5000 INJECTION INTRAVENOUS; SUBCUTANEOUS at 06:30

## 2017-04-10 RX ADMIN — Medication 81 MILLIGRAM(S): at 12:55

## 2017-04-10 RX ADMIN — Medication 50 MILLIGRAM(S): at 16:04

## 2017-04-10 RX ADMIN — Medication 100 MILLIGRAM(S): at 06:30

## 2017-04-10 RX ADMIN — Medication 3 MILLILITER(S): at 14:32

## 2017-04-10 RX ADMIN — HEPARIN SODIUM 5000 UNIT(S): 5000 INJECTION INTRAVENOUS; SUBCUTANEOUS at 22:28

## 2017-04-10 RX ADMIN — Medication 100 MILLIGRAM(S): at 12:55

## 2017-04-10 RX ADMIN — Medication 75 MILLIGRAM(S): at 17:46

## 2017-04-10 RX ADMIN — Medication 1 MILLIGRAM(S): at 16:05

## 2017-04-10 RX ADMIN — Medication 1 MILLIGRAM(S): at 06:29

## 2017-04-10 RX ADMIN — Medication 50 MILLIGRAM(S): at 06:30

## 2017-04-10 RX ADMIN — Medication 50 MILLIGRAM(S): at 22:27

## 2017-04-10 RX ADMIN — Medication 1 MILLIGRAM(S): at 22:28

## 2017-04-10 RX ADMIN — LACTULOSE 20 GRAM(S): 10 SOLUTION ORAL at 12:55

## 2017-04-10 RX ADMIN — PIPERACILLIN AND TAZOBACTAM 25 GRAM(S): 4; .5 INJECTION, POWDER, LYOPHILIZED, FOR SOLUTION INTRAVENOUS at 22:28

## 2017-04-10 RX ADMIN — HEPARIN SODIUM 5000 UNIT(S): 5000 INJECTION INTRAVENOUS; SUBCUTANEOUS at 16:04

## 2017-04-10 RX ADMIN — Medication 75 MILLIGRAM(S): at 06:30

## 2017-04-10 RX ADMIN — ATORVASTATIN CALCIUM 40 MILLIGRAM(S): 80 TABLET, FILM COATED ORAL at 22:28

## 2017-04-10 RX ADMIN — Medication 50 MICROGRAM(S): at 06:30

## 2017-04-10 RX ADMIN — Medication 3 MILLILITER(S): at 08:06

## 2017-04-10 RX ADMIN — Medication 3 MILLILITER(S): at 20:11

## 2017-04-10 RX ADMIN — Medication 100 MILLIGRAM(S): at 23:40

## 2017-04-11 ENCOUNTER — TRANSCRIPTION ENCOUNTER (OUTPATIENT)
Age: 67
End: 2017-04-11

## 2017-04-11 LAB
ANION GAP SERPL CALC-SCNC: 8 MMOL/L — SIGNIFICANT CHANGE UP (ref 5–17)
BUN SERPL-MCNC: 7 MG/DL — SIGNIFICANT CHANGE UP (ref 7–18)
CALCIUM SERPL-MCNC: 7.9 MG/DL — LOW (ref 8.4–10.5)
CHLORIDE SERPL-SCNC: 111 MMOL/L — HIGH (ref 96–108)
CO2 SERPL-SCNC: 30 MMOL/L — SIGNIFICANT CHANGE UP (ref 22–31)
CREAT SERPL-MCNC: 0.42 MG/DL — LOW (ref 0.5–1.3)
GLUCOSE SERPL-MCNC: 85 MG/DL — SIGNIFICANT CHANGE UP (ref 70–99)
HCT VFR BLD CALC: 31.6 % — LOW (ref 34.5–45)
HGB BLD-MCNC: 10.2 G/DL — LOW (ref 11.5–15.5)
MAGNESIUM SERPL-MCNC: 2.1 MG/DL — SIGNIFICANT CHANGE UP (ref 1.8–2.4)
MCHC RBC-ENTMCNC: 30.7 PG — SIGNIFICANT CHANGE UP (ref 27–34)
MCHC RBC-ENTMCNC: 32.2 GM/DL — SIGNIFICANT CHANGE UP (ref 32–36)
MCV RBC AUTO: 95.5 FL — SIGNIFICANT CHANGE UP (ref 80–100)
PHOSPHATE SERPL-MCNC: 2.6 MG/DL — SIGNIFICANT CHANGE UP (ref 2.5–4.5)
PLATELET # BLD AUTO: 151 K/UL — SIGNIFICANT CHANGE UP (ref 150–400)
POTASSIUM SERPL-MCNC: 3.4 MMOL/L — LOW (ref 3.5–5.3)
POTASSIUM SERPL-SCNC: 3.4 MMOL/L — LOW (ref 3.5–5.3)
RBC # BLD: 3.31 M/UL — LOW (ref 3.8–5.2)
RBC # FLD: 12.8 % — SIGNIFICANT CHANGE UP (ref 10.3–14.5)
SODIUM SERPL-SCNC: 149 MMOL/L — HIGH (ref 135–145)
WBC # BLD: 6.1 K/UL — SIGNIFICANT CHANGE UP (ref 3.8–10.5)
WBC # FLD AUTO: 6.1 K/UL — SIGNIFICANT CHANGE UP (ref 3.8–10.5)

## 2017-04-11 RX ORDER — HALOPERIDOL DECANOATE 100 MG/ML
1 INJECTION INTRAMUSCULAR ONCE
Qty: 0 | Refills: 0 | Status: COMPLETED | OUTPATIENT
Start: 2017-04-11 | End: 2017-04-11

## 2017-04-11 RX ORDER — CIPROFLOXACIN LACTATE 400MG/40ML
1 VIAL (ML) INTRAVENOUS
Qty: 3 | Refills: 0 | OUTPATIENT
Start: 2017-04-11 | End: 2017-04-14

## 2017-04-11 RX ORDER — ATORVASTATIN CALCIUM 80 MG/1
1 TABLET, FILM COATED ORAL
Qty: 0 | Refills: 0 | COMMUNITY
Start: 2017-04-11

## 2017-04-11 RX ORDER — POTASSIUM CHLORIDE 20 MEQ
40 PACKET (EA) ORAL ONCE
Qty: 0 | Refills: 0 | Status: COMPLETED | OUTPATIENT
Start: 2017-04-11 | End: 2017-04-11

## 2017-04-11 RX ORDER — OLANZAPINE 15 MG/1
0.5 TABLET, FILM COATED ORAL
Qty: 0 | Refills: 0 | COMMUNITY

## 2017-04-11 RX ORDER — SODIUM CHLORIDE 9 MG/ML
1000 INJECTION, SOLUTION INTRAVENOUS
Qty: 0 | Refills: 0 | Status: DISCONTINUED | OUTPATIENT
Start: 2017-04-11 | End: 2017-04-12

## 2017-04-11 RX ORDER — TRAZODONE HCL 50 MG
1 TABLET ORAL
Qty: 0 | Refills: 0 | COMMUNITY
Start: 2017-04-11

## 2017-04-11 RX ORDER — OLANZAPINE 15 MG/1
1 TABLET, FILM COATED ORAL
Qty: 0 | Refills: 0 | COMMUNITY

## 2017-04-11 RX ORDER — ASPIRIN/CALCIUM CARB/MAGNESIUM 324 MG
1 TABLET ORAL
Qty: 0 | Refills: 0 | COMMUNITY
Start: 2017-04-11

## 2017-04-11 RX ADMIN — Medication 75 MILLIGRAM(S): at 05:34

## 2017-04-11 RX ADMIN — LACTULOSE 20 GRAM(S): 10 SOLUTION ORAL at 12:20

## 2017-04-11 RX ADMIN — Medication 100 MILLIGRAM(S): at 05:34

## 2017-04-11 RX ADMIN — Medication 75 MILLIGRAM(S): at 17:21

## 2017-04-11 RX ADMIN — Medication 1 MILLIGRAM(S): at 14:15

## 2017-04-11 RX ADMIN — Medication 81 MILLIGRAM(S): at 12:20

## 2017-04-11 RX ADMIN — Medication 3 MILLILITER(S): at 20:31

## 2017-04-11 RX ADMIN — Medication 1 MILLIGRAM(S): at 21:16

## 2017-04-11 RX ADMIN — PIPERACILLIN AND TAZOBACTAM 25 GRAM(S): 4; .5 INJECTION, POWDER, LYOPHILIZED, FOR SOLUTION INTRAVENOUS at 05:35

## 2017-04-11 RX ADMIN — Medication 50 MILLIGRAM(S): at 05:34

## 2017-04-11 RX ADMIN — Medication 50 MILLIGRAM(S): at 21:16

## 2017-04-11 RX ADMIN — HEPARIN SODIUM 5000 UNIT(S): 5000 INJECTION INTRAVENOUS; SUBCUTANEOUS at 21:16

## 2017-04-11 RX ADMIN — SODIUM CHLORIDE 80 MILLILITER(S): 9 INJECTION, SOLUTION INTRAVENOUS at 21:16

## 2017-04-11 RX ADMIN — SODIUM CHLORIDE 80 MILLILITER(S): 9 INJECTION, SOLUTION INTRAVENOUS at 17:23

## 2017-04-11 RX ADMIN — PIPERACILLIN AND TAZOBACTAM 25 GRAM(S): 4; .5 INJECTION, POWDER, LYOPHILIZED, FOR SOLUTION INTRAVENOUS at 21:16

## 2017-04-11 RX ADMIN — Medication 27.5 MILLIGRAM(S): at 05:34

## 2017-04-11 RX ADMIN — Medication 27.5 MILLIGRAM(S): at 21:16

## 2017-04-11 RX ADMIN — HEPARIN SODIUM 5000 UNIT(S): 5000 INJECTION INTRAVENOUS; SUBCUTANEOUS at 14:15

## 2017-04-11 RX ADMIN — Medication 27.5 MILLIGRAM(S): at 14:15

## 2017-04-11 RX ADMIN — Medication 100 MILLIGRAM(S): at 12:19

## 2017-04-11 RX ADMIN — Medication 1 MILLIGRAM(S): at 05:36

## 2017-04-11 RX ADMIN — Medication 50 MICROGRAM(S): at 05:34

## 2017-04-11 RX ADMIN — Medication 3 MILLILITER(S): at 08:02

## 2017-04-11 RX ADMIN — HALOPERIDOL DECANOATE 1 MILLIGRAM(S): 100 INJECTION INTRAMUSCULAR at 22:03

## 2017-04-11 RX ADMIN — Medication 40 MILLIEQUIVALENT(S): at 12:20

## 2017-04-11 RX ADMIN — PIPERACILLIN AND TAZOBACTAM 25 GRAM(S): 4; .5 INJECTION, POWDER, LYOPHILIZED, FOR SOLUTION INTRAVENOUS at 14:15

## 2017-04-11 RX ADMIN — ATORVASTATIN CALCIUM 40 MILLIGRAM(S): 80 TABLET, FILM COATED ORAL at 21:16

## 2017-04-11 RX ADMIN — HEPARIN SODIUM 5000 UNIT(S): 5000 INJECTION INTRAVENOUS; SUBCUTANEOUS at 05:35

## 2017-04-11 RX ADMIN — Medication 50 MILLIGRAM(S): at 14:15

## 2017-04-11 NOTE — DISCHARGE NOTE ADULT - MEDICATION SUMMARY - MEDICATIONS TO STOP TAKING
I will STOP taking the medications listed below when I get home from the hospital:    traZODone 100 mg oral tablet  -- 1 tab(s) by mouth once a day (at bedtime)    Ceftin 500 mg oral tablet  -- 1 tab(s) by mouth 2 times a day X 1 day    cephalexin 500 mg oral tablet  -- 1 tab(s) by mouth 2 times a day  -- Finish all this medication unless otherwise directed by prescriber.    OLANZapine 10 mg oral tablet  -- 1 tab(s) by mouth once a day    ZyPREXA 10 mg oral tablet  -- 0.5 tab(s) by mouth once a day (in the morning)    OLANZapine 10 mg oral tablet  -- 1 tab(s) by mouth once a day (at bedtime)

## 2017-04-11 NOTE — DISCHARGE NOTE ADULT - CARE PLAN
Principal Discharge DX:	Influenza B Principal Discharge DX:	Influenza B  Goal:	Complete treatment  Instructions for follow-up, activity and diet:	Complete 5 days treatment with Tamiflu.  Secondary Diagnosis:	COPD (chronic obstructive pulmonary disease)  Instructions for follow-up, activity and diet:	Be complaint with medication and follow up with your primary care doctor within a week of discharge.  Secondary Diagnosis:	Bipolar disorder  Instructions for follow-up, activity and diet:	Continue Trazodone as prescribed. Discontinue Zyprexa as it can cause dysarrythmias.  Secondary Diagnosis:	Hypothyroidism  Instructions for follow-up, activity and diet:	Continue Synthyroid

## 2017-04-11 NOTE — DISCHARGE NOTE ADULT - MEDICATION SUMMARY - MEDICATIONS TO TAKE
I will START or STAY ON the medications listed below when I get home from the hospital:    Tylenol 325 mg oral tablet  -- 2 tab(s) by mouth every 4 hours, As Needed  -- Indication: For Pain    aspirin 81 mg oral tablet, chewable  -- 1 tab(s) by mouth once a day  -- Indication: For ASCVD prevention    Milk of Magnesia 8% oral suspension  -- 15 milliliter(s) by mouth once a day (at bedtime)  -- Indication: For COnstipation    clonazePAM 1 mg oral tablet  -- 1 tab(s) by mouth 3 times a day  -- Indication: For Anti convulsant    divalproex sodium 500 mg oral tablet, extended release  -- 3 tab(s) by mouth once a day  -- Indication: For Anti convulsant    traZODone 50 mg oral tablet  -- 1 tab(s) by mouth 3 times a day  -- Indication: For Depression    atorvastatin 40 mg oral tablet  -- 1 tab(s) by mouth once a day (at bedtime)  -- Indication: For HLD    Tamiflu 75 mg oral capsule  -- 1 cap(s) by mouth 2 times a day  -- Check with your doctor before becoming pregnant.  Finish all this medication unless otherwise directed by prescriber.    -- Indication: For Influenza B    lactulose 10 g/15 mL oral syrup  -- 30 milliliter(s) by mouth once a day (at bedtime)  -- Indication: For COnstipation    calcium (as calcium citrate) 250 mg oral tablet  -- 1 tab(s) by mouth once a day  -- Indication: For Multivitmain    Levaquin 500 mg oral tablet  -- 1 tab(s) by mouth once a day  -- Avoid prolonged or excessive exposure to direct and/or artificial sunlight while taking this medication.  Do not take dairy products, antacids, or iron preparations within one hour of this medication.  Finish all this medication unless otherwise directed by prescriber.  May cause drowsiness or dizziness.  Medication should be taken with plenty of water.    -- Indication: For Pneumonia of right lower lobe due to infectious organism    Synthroid 50 mcg (0.05 mg) oral tablet  -- 1 tab(s) by mouth once a day  -- Indication: For Hypothyroidism, unspecified type    multivitamin  --     -- Indication: For Vitamin

## 2017-04-11 NOTE — DISCHARGE NOTE ADULT - PATIENT PORTAL LINK FT
“You can access the [facility] Patient Portal, offered by [facility], by registering with the following website: [email or web address]”

## 2017-04-11 NOTE — DISCHARGE NOTE ADULT - HOSPITAL COURSE
67 y/o F from [facility] with PMHx of anemia, bipolar disorder, COPD, GERD, HLD, hypothyroidism, osteopenia, pacemaker, chronic paranoia, and schizophrenia, Iron deificiency Anemia, depression, Dementia with behavioural problem, sent from [facility]  for complaints of fever, cough and chest pain, although pt denies chest pain in ED. Pt is a poor historian; pt is able to answer yes or no. Pt denies chest pain, SOB, chills, trauma, nausea, vomiting, diarrhea, abd pain, weakness or any other complaints currently. Pt continues to report she wants to go home but has multiple psych disorders and not able to make decisions on her own.  Pt is full code.    Patient was admitted for Pneumonia of right lower lobe. Pt has Fever 101 F, productive cough and CXR showed RLL opacity. I will give Zosyn  ID Dr. Segovia consulted. Blood cx and urine cx. RVP  duonebs.     There was no element of Non-ST elevation MI (NSTEMI).  Plan: Pt has Chest pain, resolved currently likely due to NSTEMI  as T1 elevated 0.116 and risk factors.  EKG Sinus rhythm with first degree AV block @70 bpm with QTc prolonged 590 msec  Heparin drip started in ED as per Attending  f/u T2 and f/u T3   Continue metoprolol, aspirin and statin.  Follow ECHO   Cardiology Consult: Dr Ponce consulted 67 y/o F from [facility] with PMHx of anemia, bipolar disorder, COPD, GERD, HLD, hypothyroidism, osteopenia, pacemaker, chronic paranoia, and schizophrenia, Iron deificiency Anemia, depression, Dementia with behavioural problem, sent from [facility]  for complaints of fever, cough and chest pain, although pt denies chest pain in ED. Pt is a poor historian; pt is able to answer yes or no. Pt denies chest pain, SOB, chills, trauma, nausea, vomiting, diarrhea, abd pain, weakness or any other complaints currently. Pt continues to report she wants to go home but has multiple psych disorders and not able to make decisions on her own.  Pt is full code.    Patient was admitted for Pneumonia of right lower lobe. Pt has Fever 101 F, productive cough and CXR showed RLL opacity. Iv Zosyn was staretd and ID Dr. Segovia was consulted. Blood cx and urine cx remain -ve . RVP was +ve so Tamiflu was started.  There was no element of Non-ST elevation MI (NSTEMI). Initially troponin was elevated due to demand ischaemia. ECHO showed Normal left ventricular internal dimensions and wall thicknesses. Cardiology Consult: Dr Ponce was consulted.     Patient remained a febrile during the admission and plan was made to discharge the patient on Levaquin     Patient is stable for discharge as per attendings and he should follow up with primary care doctor within a week of discharge.

## 2017-04-11 NOTE — DISCHARGE NOTE ADULT - PLAN OF CARE
Complete treatment Complete 5 days treatment with Tamiflu. Be complaint with medication and follow up with your primary care doctor within a week of discharge. Continue Trazodone as prescribed. Discontinue Zyprexa as it can cause dysarrythmias. Continue Synthyroid

## 2017-04-12 VITALS
TEMPERATURE: 98 F | DIASTOLIC BLOOD PRESSURE: 69 MMHG | OXYGEN SATURATION: 97 % | RESPIRATION RATE: 18 BRPM | SYSTOLIC BLOOD PRESSURE: 138 MMHG | HEART RATE: 73 BPM

## 2017-04-12 LAB
HCT VFR BLD CALC: 31.5 % — LOW (ref 34.5–45)
HGB BLD-MCNC: 10 G/DL — LOW (ref 11.5–15.5)
MCHC RBC-ENTMCNC: 30.5 PG — SIGNIFICANT CHANGE UP (ref 27–34)
MCHC RBC-ENTMCNC: 31.9 GM/DL — LOW (ref 32–36)
MCV RBC AUTO: 95.8 FL — SIGNIFICANT CHANGE UP (ref 80–100)
PLATELET # BLD AUTO: 157 K/UL — SIGNIFICANT CHANGE UP (ref 150–400)
RBC # BLD: 3.29 M/UL — LOW (ref 3.8–5.2)
RBC # FLD: 13 % — SIGNIFICANT CHANGE UP (ref 10.3–14.5)
WBC # BLD: 5.4 K/UL — SIGNIFICANT CHANGE UP (ref 3.8–10.5)
WBC # FLD AUTO: 5.4 K/UL — SIGNIFICANT CHANGE UP (ref 3.8–10.5)

## 2017-04-12 PROCEDURE — 87798 DETECT AGENT NOS DNA AMP: CPT

## 2017-04-12 PROCEDURE — 82553 CREATINE MB FRACTION: CPT

## 2017-04-12 PROCEDURE — 87633 RESP VIRUS 12-25 TARGETS: CPT

## 2017-04-12 PROCEDURE — 82550 ASSAY OF CK (CPK): CPT

## 2017-04-12 PROCEDURE — 86901 BLOOD TYPING SEROLOGIC RH(D): CPT

## 2017-04-12 PROCEDURE — 80061 LIPID PANEL: CPT

## 2017-04-12 PROCEDURE — 96374 THER/PROPH/DIAG INJ IV PUSH: CPT

## 2017-04-12 PROCEDURE — 94640 AIRWAY INHALATION TREATMENT: CPT

## 2017-04-12 PROCEDURE — 87040 BLOOD CULTURE FOR BACTERIA: CPT

## 2017-04-12 PROCEDURE — 85730 THROMBOPLASTIN TIME PARTIAL: CPT

## 2017-04-12 PROCEDURE — 93005 ELECTROCARDIOGRAM TRACING: CPT

## 2017-04-12 PROCEDURE — 82607 VITAMIN B-12: CPT

## 2017-04-12 PROCEDURE — 71045 X-RAY EXAM CHEST 1 VIEW: CPT

## 2017-04-12 PROCEDURE — 85610 PROTHROMBIN TIME: CPT

## 2017-04-12 PROCEDURE — 87581 M.PNEUMON DNA AMP PROBE: CPT

## 2017-04-12 PROCEDURE — 97162 PT EVAL MOD COMPLEX 30 MIN: CPT

## 2017-04-12 PROCEDURE — 99285 EMERGENCY DEPT VISIT HI MDM: CPT | Mod: 25

## 2017-04-12 PROCEDURE — 84100 ASSAY OF PHOSPHORUS: CPT

## 2017-04-12 PROCEDURE — 83036 HEMOGLOBIN GLYCOSYLATED A1C: CPT

## 2017-04-12 PROCEDURE — 96375 TX/PRO/DX INJ NEW DRUG ADDON: CPT

## 2017-04-12 PROCEDURE — 80053 COMPREHEN METABOLIC PANEL: CPT

## 2017-04-12 PROCEDURE — 80048 BASIC METABOLIC PNL TOTAL CA: CPT

## 2017-04-12 PROCEDURE — 86850 RBC ANTIBODY SCREEN: CPT

## 2017-04-12 PROCEDURE — 83605 ASSAY OF LACTIC ACID: CPT

## 2017-04-12 PROCEDURE — 85027 COMPLETE CBC AUTOMATED: CPT

## 2017-04-12 PROCEDURE — 80164 ASSAY DIPROPYLACETIC ACD TOT: CPT

## 2017-04-12 PROCEDURE — 93306 TTE W/DOPPLER COMPLETE: CPT

## 2017-04-12 PROCEDURE — 84484 ASSAY OF TROPONIN QUANT: CPT

## 2017-04-12 PROCEDURE — 82746 ASSAY OF FOLIC ACID SERUM: CPT

## 2017-04-12 PROCEDURE — 87086 URINE CULTURE/COLONY COUNT: CPT

## 2017-04-12 PROCEDURE — 87486 CHLMYD PNEUM DNA AMP PROBE: CPT

## 2017-04-12 PROCEDURE — 83735 ASSAY OF MAGNESIUM: CPT

## 2017-04-12 PROCEDURE — 81001 URINALYSIS AUTO W/SCOPE: CPT

## 2017-04-12 RX ORDER — ACETAMINOPHEN 500 MG
650 TABLET ORAL EVERY 6 HOURS
Qty: 0 | Refills: 0 | Status: DISCONTINUED | OUTPATIENT
Start: 2017-04-12 | End: 2017-04-12

## 2017-04-12 RX ADMIN — PIPERACILLIN AND TAZOBACTAM 25 GRAM(S): 4; .5 INJECTION, POWDER, LYOPHILIZED, FOR SOLUTION INTRAVENOUS at 05:32

## 2017-04-12 RX ADMIN — Medication 100 MILLIGRAM(S): at 12:11

## 2017-04-12 RX ADMIN — Medication 1 MILLIGRAM(S): at 14:32

## 2017-04-12 RX ADMIN — Medication 650 MILLIGRAM(S): at 12:11

## 2017-04-12 RX ADMIN — Medication 50 MICROGRAM(S): at 05:32

## 2017-04-12 RX ADMIN — Medication 650 MILLIGRAM(S): at 06:18

## 2017-04-12 RX ADMIN — LACTULOSE 20 GRAM(S): 10 SOLUTION ORAL at 12:11

## 2017-04-12 RX ADMIN — Medication 3 MILLILITER(S): at 08:24

## 2017-04-12 RX ADMIN — Medication 650 MILLIGRAM(S): at 05:31

## 2017-04-12 RX ADMIN — HEPARIN SODIUM 5000 UNIT(S): 5000 INJECTION INTRAVENOUS; SUBCUTANEOUS at 05:32

## 2017-04-12 RX ADMIN — SODIUM CHLORIDE 80 MILLILITER(S): 9 INJECTION, SOLUTION INTRAVENOUS at 05:30

## 2017-04-12 RX ADMIN — Medication 81 MILLIGRAM(S): at 12:11

## 2017-04-12 RX ADMIN — Medication 100 MILLIGRAM(S): at 05:33

## 2017-04-12 RX ADMIN — Medication 75 MILLIGRAM(S): at 05:31

## 2017-04-12 RX ADMIN — Medication 50 MILLIGRAM(S): at 05:32

## 2017-04-12 RX ADMIN — HEPARIN SODIUM 5000 UNIT(S): 5000 INJECTION INTRAVENOUS; SUBCUTANEOUS at 14:32

## 2017-04-12 RX ADMIN — Medication 27.5 MILLIGRAM(S): at 05:33

## 2017-04-12 RX ADMIN — Medication 50 MILLIGRAM(S): at 14:32

## 2017-04-12 RX ADMIN — Medication 650 MILLIGRAM(S): at 13:00

## 2017-04-12 RX ADMIN — Medication 1 MILLIGRAM(S): at 05:31

## 2017-04-12 RX ADMIN — PIPERACILLIN AND TAZOBACTAM 25 GRAM(S): 4; .5 INJECTION, POWDER, LYOPHILIZED, FOR SOLUTION INTRAVENOUS at 14:32

## 2017-04-12 RX ADMIN — Medication 27.5 MILLIGRAM(S): at 14:32

## 2017-04-12 NOTE — PHYSICAL THERAPY INITIAL EVALUATION ADULT - GENERAL OBSERVATIONS, REHAB EVAL
Pt awake verbal, from facility, assist required for bedmobility assessment witn assist, hob support to weak trunk control

## 2017-04-12 NOTE — PHYSICAL THERAPY INITIAL EVALUATION ADULT - IMPAIRMENTS FOUND, PT EVAL
gross motor/tone/muscle strength/gait, locomotion, and balance/aerobic capacity/endurance/poor safety awareness

## 2017-04-13 LAB
CULTURE RESULTS: SIGNIFICANT CHANGE UP
CULTURE RESULTS: SIGNIFICANT CHANGE UP
SPECIMEN SOURCE: SIGNIFICANT CHANGE UP
SPECIMEN SOURCE: SIGNIFICANT CHANGE UP

## 2017-04-19 DIAGNOSIS — F42.9 OBSESSIVE-COMPULSIVE DISORDER, UNSPECIFIED: ICD-10-CM

## 2017-04-19 DIAGNOSIS — F41.9 ANXIETY DISORDER, UNSPECIFIED: ICD-10-CM

## 2017-04-19 DIAGNOSIS — R45.1 RESTLESSNESS AND AGITATION: ICD-10-CM

## 2017-04-19 DIAGNOSIS — Z28.21 IMMUNIZATION NOT CARRIED OUT BECAUSE OF PATIENT REFUSAL: ICD-10-CM

## 2017-04-19 DIAGNOSIS — J18.1 LOBAR PNEUMONIA, UNSPECIFIED ORGANISM: ICD-10-CM

## 2017-04-19 DIAGNOSIS — I45.81 LONG QT SYNDROME: ICD-10-CM

## 2017-04-19 DIAGNOSIS — J10.08 INFLUENZA DUE TO OTHER IDENTIFIED INFLUENZA VIRUS WITH OTHER SPECIFIED PNEUMONIA: ICD-10-CM

## 2017-04-19 DIAGNOSIS — E87.0 HYPEROSMOLALITY AND HYPERNATREMIA: ICD-10-CM

## 2017-04-19 DIAGNOSIS — Z95.0 PRESENCE OF CARDIAC PACEMAKER: ICD-10-CM

## 2017-04-19 DIAGNOSIS — I44.7 LEFT BUNDLE-BRANCH BLOCK, UNSPECIFIED: ICD-10-CM

## 2017-04-19 DIAGNOSIS — N30.00 ACUTE CYSTITIS WITHOUT HEMATURIA: ICD-10-CM

## 2017-04-19 DIAGNOSIS — M85.80 OTHER SPECIFIED DISORDERS OF BONE DENSITY AND STRUCTURE, UNSPECIFIED SITE: ICD-10-CM

## 2017-04-19 DIAGNOSIS — I95.9 HYPOTENSION, UNSPECIFIED: ICD-10-CM

## 2017-04-19 DIAGNOSIS — F03.91 UNSPECIFIED DEMENTIA WITH BEHAVIORAL DISTURBANCE: ICD-10-CM

## 2017-04-19 DIAGNOSIS — I24.8 OTHER FORMS OF ACUTE ISCHEMIC HEART DISEASE: ICD-10-CM

## 2017-04-19 DIAGNOSIS — D50.9 IRON DEFICIENCY ANEMIA, UNSPECIFIED: ICD-10-CM

## 2017-04-19 DIAGNOSIS — F31.9 BIPOLAR DISORDER, UNSPECIFIED: ICD-10-CM

## 2017-04-19 DIAGNOSIS — E03.9 HYPOTHYROIDISM, UNSPECIFIED: ICD-10-CM

## 2017-04-19 DIAGNOSIS — Z91.013 ALLERGY TO SEAFOOD: ICD-10-CM

## 2017-04-19 DIAGNOSIS — E78.5 HYPERLIPIDEMIA, UNSPECIFIED: ICD-10-CM

## 2017-04-19 DIAGNOSIS — K21.9 GASTRO-ESOPHAGEAL REFLUX DISEASE WITHOUT ESOPHAGITIS: ICD-10-CM

## 2017-04-19 DIAGNOSIS — Z62.810 PERSONAL HISTORY OF PHYSICAL AND SEXUAL ABUSE IN CHILDHOOD: ICD-10-CM

## 2017-04-19 DIAGNOSIS — J44.9 CHRONIC OBSTRUCTIVE PULMONARY DISEASE, UNSPECIFIED: ICD-10-CM

## 2017-04-19 DIAGNOSIS — F20.0 PARANOID SCHIZOPHRENIA: ICD-10-CM

## 2018-07-23 NOTE — DISCHARGE NOTE ADULT - NSTOBACCOREFERRAL_GEN_A_CS
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  CXR here at [facility] reviewed with radiology did not suggest evidence of active disease. 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  If, however, family feels strongly that it should be treated, unfortunately, the only option would be 9 months of INH/B6 due to drug-drug interaction of some of her medications with rifampin. Family is not willing to subject her to bronchoscopically obtain sputum specimen as pt is unable to bring up sputum. ID reccs: stop isolation, not a good candidate for LTBI treatment given co-morbidities and drug-drug interactions.  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  CXR here at [facility] reviewed with radiology did not suggest evidence of active disease. 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  If, however, family feels strongly that it should be treated, unfortunately, the only option would be 9 months of INH/B6 due to drug-drug interaction of some of her medications with rifampin. Family is not willing to subject her to bronchoscopically obtain sputum specimen as pt is unable to bring up sputum. ID reccs: stop isolation, not a good candidate for LTBI treatment given co-morbidities and drug-drug interactions.  Discharge pt to nursing with PMD f/u. Patient declined information

## 2018-09-25 NOTE — H&P ADULT. - BREASTS
attending Octavio: pt consented for blood. Awaiting fecal occult result. BP improved, IVF infusing Karl Jauregui, PGY3: BP improving . Saturating well on NRB. Pending blood transfusion and CTA. Treating UTI Karl Jauregui, PGY3: BP improving . Saturating well on NRB. Pending blood transfusion and CTA. Treating UTI. Guaiac negative attending Octavio: paged [facility] for admission. Discussed with [facility] attending Soraya who refused admission as she reports pt and family reportedly verbally abusive to [facility] team during previous admission. No masses; no nipple discharge

## 2019-02-09 ENCOUNTER — INPATIENT (INPATIENT)
Facility: HOSPITAL | Age: 69
LOS: 2 days | Discharge: NURSING, REHAB OR LONG-TERM CARE | DRG: 481 | End: 2019-02-12
Attending: INTERNAL MEDICINE | Admitting: INTERNAL MEDICINE
Payer: MEDICARE

## 2019-02-09 ENCOUNTER — TRANSCRIPTION ENCOUNTER (OUTPATIENT)
Age: 69
End: 2019-02-09

## 2019-02-09 VITALS
HEIGHT: 65 IN | DIASTOLIC BLOOD PRESSURE: 71 MMHG | WEIGHT: 175.05 LBS | TEMPERATURE: 99 F | RESPIRATION RATE: 16 BRPM | OXYGEN SATURATION: 90 % | SYSTOLIC BLOOD PRESSURE: 111 MMHG | HEART RATE: 82 BPM

## 2019-02-09 DIAGNOSIS — J44.9 CHRONIC OBSTRUCTIVE PULMONARY DISEASE, UNSPECIFIED: ICD-10-CM

## 2019-02-09 DIAGNOSIS — E03.9 HYPOTHYROIDISM, UNSPECIFIED: ICD-10-CM

## 2019-02-09 DIAGNOSIS — S72.009A FRACTURE OF UNSPECIFIED PART OF NECK OF UNSPECIFIED FEMUR, INITIAL ENCOUNTER FOR CLOSED FRACTURE: ICD-10-CM

## 2019-02-09 DIAGNOSIS — F31.9 BIPOLAR DISORDER, UNSPECIFIED: ICD-10-CM

## 2019-02-09 DIAGNOSIS — Z29.9 ENCOUNTER FOR PROPHYLACTIC MEASURES, UNSPECIFIED: ICD-10-CM

## 2019-02-09 LAB
ALBUMIN SERPL ELPH-MCNC: 2.6 G/DL — LOW (ref 3.5–5)
ALP SERPL-CCNC: 80 U/L — SIGNIFICANT CHANGE UP (ref 40–120)
ALT FLD-CCNC: 21 U/L DA — SIGNIFICANT CHANGE UP (ref 10–60)
ANION GAP SERPL CALC-SCNC: 7 MMOL/L — SIGNIFICANT CHANGE UP (ref 5–17)
APPEARANCE UR: CLEAR — SIGNIFICANT CHANGE UP
APTT BLD: 36.5 SEC — HIGH (ref 27.5–36.3)
AST SERPL-CCNC: 13 U/L — SIGNIFICANT CHANGE UP (ref 10–40)
BASOPHILS # BLD AUTO: 0.2 K/UL — SIGNIFICANT CHANGE UP (ref 0–0.2)
BASOPHILS NFR BLD AUTO: 1.5 % — SIGNIFICANT CHANGE UP (ref 0–2)
BILIRUB SERPL-MCNC: 0.7 MG/DL — SIGNIFICANT CHANGE UP (ref 0.2–1.2)
BILIRUB UR-MCNC: NEGATIVE — SIGNIFICANT CHANGE UP
BLD GP AB SCN SERPL QL: SIGNIFICANT CHANGE UP
BUN SERPL-MCNC: 12 MG/DL — SIGNIFICANT CHANGE UP (ref 7–18)
CALCIUM SERPL-MCNC: 8.5 MG/DL — SIGNIFICANT CHANGE UP (ref 8.4–10.5)
CHLORIDE SERPL-SCNC: 106 MMOL/L — SIGNIFICANT CHANGE UP (ref 96–108)
CO2 SERPL-SCNC: 27 MMOL/L — SIGNIFICANT CHANGE UP (ref 22–31)
COLOR SPEC: YELLOW — SIGNIFICANT CHANGE UP
CREAT SERPL-MCNC: 0.54 MG/DL — SIGNIFICANT CHANGE UP (ref 0.5–1.3)
DIFF PNL FLD: NEGATIVE — SIGNIFICANT CHANGE UP
EOSINOPHIL # BLD AUTO: 0.1 K/UL — SIGNIFICANT CHANGE UP (ref 0–0.5)
EOSINOPHIL NFR BLD AUTO: 0.7 % — SIGNIFICANT CHANGE UP (ref 0–6)
EPI CELLS # UR: ABNORMAL /HPF
GLUCOSE SERPL-MCNC: 103 MG/DL — HIGH (ref 70–99)
GLUCOSE UR QL: NEGATIVE — SIGNIFICANT CHANGE UP
HCT VFR BLD CALC: 28 % — LOW (ref 34.5–45)
HCV AB S/CO SERPL IA: 0.04 S/CO — SIGNIFICANT CHANGE UP
HCV AB SERPL-IMP: SIGNIFICANT CHANGE UP
HGB BLD-MCNC: 9 G/DL — LOW (ref 11.5–15.5)
INR BLD: 1.15 RATIO — SIGNIFICANT CHANGE UP (ref 0.88–1.16)
KETONES UR-MCNC: NEGATIVE — SIGNIFICANT CHANGE UP
LEUKOCYTE ESTERASE UR-ACNC: ABNORMAL
LYMPHOCYTES # BLD AUTO: 18.4 % — SIGNIFICANT CHANGE UP (ref 13–44)
LYMPHOCYTES # BLD AUTO: 2.2 K/UL — SIGNIFICANT CHANGE UP (ref 1–3.3)
MCHC RBC-ENTMCNC: 29.6 PG — SIGNIFICANT CHANGE UP (ref 27–34)
MCHC RBC-ENTMCNC: 32.2 GM/DL — SIGNIFICANT CHANGE UP (ref 32–36)
MCV RBC AUTO: 92 FL — SIGNIFICANT CHANGE UP (ref 80–100)
MONOCYTES # BLD AUTO: 1.5 K/UL — HIGH (ref 0–0.9)
MONOCYTES NFR BLD AUTO: 12.3 % — SIGNIFICANT CHANGE UP (ref 2–14)
NEUTROPHILS # BLD AUTO: 8.2 K/UL — HIGH (ref 1.8–7.4)
NEUTROPHILS NFR BLD AUTO: 67.2 % — SIGNIFICANT CHANGE UP (ref 43–77)
NITRITE UR-MCNC: NEGATIVE — SIGNIFICANT CHANGE UP
PH UR: 8 — SIGNIFICANT CHANGE UP (ref 5–8)
PLATELET # BLD AUTO: 375 K/UL — SIGNIFICANT CHANGE UP (ref 150–400)
POTASSIUM SERPL-MCNC: 4.3 MMOL/L — SIGNIFICANT CHANGE UP (ref 3.5–5.3)
POTASSIUM SERPL-SCNC: 4.3 MMOL/L — SIGNIFICANT CHANGE UP (ref 3.5–5.3)
PROT SERPL-MCNC: 6.2 G/DL — SIGNIFICANT CHANGE UP (ref 6–8.3)
PROT UR-MCNC: NEGATIVE — SIGNIFICANT CHANGE UP
PROTHROM AB SERPL-ACNC: 12.8 SEC — SIGNIFICANT CHANGE UP (ref 10–12.9)
RBC # BLD: 3.04 M/UL — LOW (ref 3.8–5.2)
RBC # FLD: 14.2 % — SIGNIFICANT CHANGE UP (ref 10.3–14.5)
RBC CASTS # UR COMP ASSIST: SIGNIFICANT CHANGE UP /HPF (ref 0–2)
SODIUM SERPL-SCNC: 140 MMOL/L — SIGNIFICANT CHANGE UP (ref 135–145)
SP GR SPEC: 1.01 — SIGNIFICANT CHANGE UP (ref 1.01–1.02)
UROBILINOGEN FLD QL: NEGATIVE — SIGNIFICANT CHANGE UP
VALPROATE SERPL-MCNC: 23 UG/ML — LOW (ref 50–100)
WBC # BLD: 12.1 K/UL — HIGH (ref 3.8–10.5)
WBC # FLD AUTO: 12.1 K/UL — HIGH (ref 3.8–10.5)
WBC UR QL: ABNORMAL /HPF (ref 0–5)

## 2019-02-09 PROCEDURE — 99285 EMERGENCY DEPT VISIT HI MDM: CPT

## 2019-02-09 PROCEDURE — 73562 X-RAY EXAM OF KNEE 3: CPT | Mod: 26,RT

## 2019-02-09 PROCEDURE — 71045 X-RAY EXAM CHEST 1 VIEW: CPT | Mod: 26

## 2019-02-09 PROCEDURE — 73521 X-RAY EXAM HIPS BI 2 VIEWS: CPT | Mod: 26

## 2019-02-09 RX ORDER — SODIUM CHLORIDE 9 MG/ML
2400 INJECTION INTRAMUSCULAR; INTRAVENOUS; SUBCUTANEOUS ONCE
Qty: 0 | Refills: 0 | Status: DISCONTINUED | OUTPATIENT
Start: 2019-02-09 | End: 2019-02-09

## 2019-02-09 RX ORDER — ASPIRIN/CALCIUM CARB/MAGNESIUM 324 MG
81 TABLET ORAL DAILY
Qty: 0 | Refills: 0 | Status: DISCONTINUED | OUTPATIENT
Start: 2019-02-09 | End: 2019-02-12

## 2019-02-09 RX ORDER — OLANZAPINE 15 MG/1
15 TABLET, FILM COATED ORAL DAILY
Qty: 0 | Refills: 0 | Status: DISCONTINUED | OUTPATIENT
Start: 2019-02-09 | End: 2019-02-12

## 2019-02-09 RX ORDER — MORPHINE SULFATE 50 MG/1
1 CAPSULE, EXTENDED RELEASE ORAL EVERY 6 HOURS
Qty: 0 | Refills: 0 | Status: DISCONTINUED | OUTPATIENT
Start: 2019-02-09 | End: 2019-02-10

## 2019-02-09 RX ORDER — PANTOPRAZOLE SODIUM 20 MG/1
40 TABLET, DELAYED RELEASE ORAL
Qty: 0 | Refills: 0 | Status: DISCONTINUED | OUTPATIENT
Start: 2019-02-09 | End: 2019-02-12

## 2019-02-09 RX ORDER — ONDANSETRON 8 MG/1
4 TABLET, FILM COATED ORAL ONCE
Qty: 0 | Refills: 0 | Status: COMPLETED | OUTPATIENT
Start: 2019-02-09 | End: 2019-02-09

## 2019-02-09 RX ORDER — TRAMADOL HYDROCHLORIDE 50 MG/1
25 TABLET ORAL EVERY 6 HOURS
Qty: 0 | Refills: 0 | Status: DISCONTINUED | OUTPATIENT
Start: 2019-02-09 | End: 2019-02-12

## 2019-02-09 RX ORDER — ACETAMINOPHEN 500 MG
650 TABLET ORAL EVERY 6 HOURS
Qty: 0 | Refills: 0 | Status: DISCONTINUED | OUTPATIENT
Start: 2019-02-09 | End: 2019-02-12

## 2019-02-09 RX ORDER — MORPHINE SULFATE 50 MG/1
4 CAPSULE, EXTENDED RELEASE ORAL ONCE
Qty: 0 | Refills: 0 | Status: DISCONTINUED | OUTPATIENT
Start: 2019-02-09 | End: 2019-02-09

## 2019-02-09 RX ORDER — ACETAMINOPHEN 500 MG
650 TABLET ORAL ONCE
Qty: 0 | Refills: 0 | Status: COMPLETED | OUTPATIENT
Start: 2019-02-09 | End: 2019-02-09

## 2019-02-09 RX ORDER — ACETAMINOPHEN 500 MG
650 TABLET ORAL EVERY 4 HOURS
Qty: 0 | Refills: 0 | Status: DISCONTINUED | OUTPATIENT
Start: 2019-02-09 | End: 2019-02-09

## 2019-02-09 RX ORDER — CLONAZEPAM 1 MG
0.5 TABLET ORAL
Qty: 0 | Refills: 0 | Status: DISCONTINUED | OUTPATIENT
Start: 2019-02-09 | End: 2019-02-12

## 2019-02-09 RX ORDER — HALOPERIDOL DECANOATE 100 MG/ML
5 INJECTION INTRAMUSCULAR ONCE
Qty: 0 | Refills: 0 | Status: COMPLETED | OUTPATIENT
Start: 2019-02-09 | End: 2019-02-09

## 2019-02-09 RX ORDER — LEVOTHYROXINE SODIUM 125 MCG
50 TABLET ORAL DAILY
Qty: 0 | Refills: 0 | Status: DISCONTINUED | OUTPATIENT
Start: 2019-02-09 | End: 2019-02-12

## 2019-02-09 RX ORDER — LACTULOSE 10 G/15ML
20 SOLUTION ORAL AT BEDTIME
Qty: 0 | Refills: 0 | Status: DISCONTINUED | OUTPATIENT
Start: 2019-02-09 | End: 2019-02-12

## 2019-02-09 RX ORDER — ENOXAPARIN SODIUM 100 MG/ML
40 INJECTION SUBCUTANEOUS DAILY
Qty: 0 | Refills: 0 | Status: DISCONTINUED | OUTPATIENT
Start: 2019-02-09 | End: 2019-02-10

## 2019-02-09 RX ORDER — MORPHINE SULFATE 50 MG/1
2 CAPSULE, EXTENDED RELEASE ORAL ONCE
Qty: 0 | Refills: 0 | Status: DISCONTINUED | OUTPATIENT
Start: 2019-02-09 | End: 2019-02-09

## 2019-02-09 RX ORDER — ACETAMINOPHEN 500 MG
650 TABLET ORAL EVERY 6 HOURS
Qty: 0 | Refills: 0 | Status: DISCONTINUED | OUTPATIENT
Start: 2019-02-09 | End: 2019-02-09

## 2019-02-09 RX ORDER — ACETAMINOPHEN 500 MG
2 TABLET ORAL
Qty: 0 | Refills: 0 | COMMUNITY

## 2019-02-09 RX ORDER — VALPROIC ACID (AS SODIUM SALT) 250 MG/5ML
1500 SOLUTION, ORAL ORAL
Qty: 0 | Refills: 0 | Status: DISCONTINUED | OUTPATIENT
Start: 2019-02-09 | End: 2019-02-12

## 2019-02-09 RX ORDER — OLANZAPINE 15 MG/1
15 TABLET, FILM COATED ORAL ONCE
Qty: 0 | Refills: 0 | Status: COMPLETED | OUTPATIENT
Start: 2019-02-09 | End: 2019-02-09

## 2019-02-09 RX ORDER — SODIUM CHLORIDE 9 MG/ML
1000 INJECTION INTRAMUSCULAR; INTRAVENOUS; SUBCUTANEOUS ONCE
Qty: 0 | Refills: 0 | Status: DISCONTINUED | OUTPATIENT
Start: 2019-02-09 | End: 2019-02-09

## 2019-02-09 RX ORDER — LACTULOSE 10 G/15ML
1 SOLUTION ORAL
Qty: 0 | Refills: 0 | COMMUNITY

## 2019-02-09 RX ORDER — MAGNESIUM HYDROXIDE 400 MG/1
15 TABLET, CHEWABLE ORAL
Qty: 0 | Refills: 0 | COMMUNITY

## 2019-02-09 RX ADMIN — Medication 1500 MILLIGRAM(S): at 18:21

## 2019-02-09 RX ADMIN — Medication 650 MILLIGRAM(S): at 13:38

## 2019-02-09 RX ADMIN — Medication 650 MILLIGRAM(S): at 18:19

## 2019-02-09 RX ADMIN — Medication 650 MILLIGRAM(S): at 23:57

## 2019-02-09 RX ADMIN — HALOPERIDOL DECANOATE 5 MILLIGRAM(S): 100 INJECTION INTRAMUSCULAR at 10:34

## 2019-02-09 RX ADMIN — MORPHINE SULFATE 2 MILLIGRAM(S): 50 CAPSULE, EXTENDED RELEASE ORAL at 13:39

## 2019-02-09 RX ADMIN — Medication 650 MILLIGRAM(S): at 20:17

## 2019-02-09 RX ADMIN — Medication 0.5 MILLIGRAM(S): at 18:19

## 2019-02-09 RX ADMIN — Medication 650 MILLIGRAM(S): at 07:34

## 2019-02-09 RX ADMIN — Medication 81 MILLIGRAM(S): at 13:37

## 2019-02-09 RX ADMIN — ONDANSETRON 4 MILLIGRAM(S): 8 TABLET, FILM COATED ORAL at 10:13

## 2019-02-09 RX ADMIN — LACTULOSE 20 GRAM(S): 10 SOLUTION ORAL at 22:41

## 2019-02-09 RX ADMIN — MORPHINE SULFATE 2 MILLIGRAM(S): 50 CAPSULE, EXTENDED RELEASE ORAL at 14:09

## 2019-02-09 RX ADMIN — Medication 650 MILLIGRAM(S): at 14:08

## 2019-02-09 RX ADMIN — Medication 650 MILLIGRAM(S): at 08:04

## 2019-02-09 RX ADMIN — MORPHINE SULFATE 4 MILLIGRAM(S): 50 CAPSULE, EXTENDED RELEASE ORAL at 10:43

## 2019-02-09 RX ADMIN — OLANZAPINE 15 MILLIGRAM(S): 15 TABLET, FILM COATED ORAL at 23:05

## 2019-02-09 RX ADMIN — Medication 650 MILLIGRAM(S): at 22:47

## 2019-02-09 RX ADMIN — MORPHINE SULFATE 4 MILLIGRAM(S): 50 CAPSULE, EXTENDED RELEASE ORAL at 10:13

## 2019-02-09 RX ADMIN — MORPHINE SULFATE 1 MILLIGRAM(S): 50 CAPSULE, EXTENDED RELEASE ORAL at 22:35

## 2019-02-09 RX ADMIN — TRAMADOL HYDROCHLORIDE 25 MILLIGRAM(S): 50 TABLET ORAL at 23:10

## 2019-02-09 RX ADMIN — MORPHINE SULFATE 1 MILLIGRAM(S): 50 CAPSULE, EXTENDED RELEASE ORAL at 22:50

## 2019-02-09 NOTE — ED ADULT NURSE NOTE - ED STAT RN HANDOFF DETAILS
Patient endorsed to hold RN  Yessi stable in no acute distress being transported via stretcher by PCA safety maintained.

## 2019-02-09 NOTE — H&P ADULT - ASSESSMENT
69 y/o F from [facility] with PMHx of anemia, bipolar disorder, COPD, GERD, HLD, hypothyroidism, osteopenia, pacemaker, chronic paranoia, and schizophrenia, Iron deificiency Anemia, depression, Dementia with behavioural problem, sent from [facility] s/p Fall in bathroom.     Patient is admitted for Comminuted mildly displaced intertrochanteric fracture right femoral neck.

## 2019-02-09 NOTE — H&P ADULT - HISTORY OF PRESENT ILLNESS
69 y/o F from [facility] with PMHx of anemia, bipolar disorder, COPD, GERD, HLD, hypothyroidism, osteopenia, pacemaker, chronic paranoia, and schizophrenia, Iron deificiency Anemia, depression, Dementia with behavioural problem, sent from [facility] s/p Fall in bathroom. Patient is AXO 1= poor historian, shouting in pain. Further hx is limited.    In ED, patient's vital signs were stable. Labs were remarkab 69 y/o F from [facility] with PMHx of anemia, bipolar disorder, COPD, GERD, HLD, hypothyroidism, osteopenia, pacemaker, chronic paranoia, and schizophrenia, Iron deificiency Anemia, depression, Dementia with behavioural problem, sent from [facility] s/p Fall in bathroom. Patient is AXO 1= poor historian, states she fell in bathroom, she has knee pain. Further hx is limited.    In ED, patient's vital signs were stable. Labs were remarkable for WBC:12.1, Hb:9.0. Xray of Knee shows" Osteoporosis. No fracture." CXR: Mild pulmonary vascular congestion. Cardiomegaly. Pacemaker. Xray of hip shows " Comminuted mildly displaced intertrochanteric fracture right femoral neck. Osteoporosis."    Goals of care mentioned in NH chart: Full code. 69 y/o F from [facility], wheelchair bound with PMHx of anemia, bipolar disorder, COPD, GERD, HLD, hypothyroidism, osteopenia, pacemaker, chronic paranoia, and schizophrenia, Iron deificiency Anemia, depression, Dementia with behavioural problem, sent from [facility] s/p Fall in bathroom. Patient is AXO 1= poor historian, states she fell in bathroom, she has knee pain. Further hx is limited.    In ED, patient's vital signs were stable. Labs were remarkable for WBC:12.1, Hb:9.0. Xray of Knee shows" Osteoporosis. No fracture." CXR: Mild pulmonary vascular congestion. Cardiomegaly. Pacemaker. Xray of hip shows " Comminuted mildly displaced intertrochanteric fracture right femoral neck. Osteoporosis."    Goals of care mentioned in NH chart: Full code.

## 2019-02-09 NOTE — H&P ADULT - PROBLEM SELECTOR PLAN 1
s/p fall, complaining of leg pain likely mechanical fall  Xray shows " Comminuted mildly displaced intertrochanteric fracture right femoral neck."  c/w morphine, tyelnol, tramadol  Ortho consulted Dr. Iverson

## 2019-02-09 NOTE — ED ADULT NURSE NOTE - NSIMPLEMENTINTERV_GEN_ALL_ED
Implemented All Fall with Harm Risk Interventions:  Champlain to call system. Call bell, personal items and telephone within reach. Instruct patient to call for assistance. Room bathroom lighting operational. Non-slip footwear when patient is off stretcher. Physically safe environment: no spills, clutter or unnecessary equipment. Stretcher in lowest position, wheels locked, appropriate side rails in place. Provide visual cue, wrist band, yellow gown, etc. Monitor gait and stability. Monitor for mental status changes and reorient to person, place, and time. Review medications for side effects contributing to fall risk. Reinforce activity limits and safety measures with patient and family. Provide visual clues: red socks.

## 2019-02-09 NOTE — ED ADULT NURSE REASSESSMENT NOTE - NS ED NURSE REASSESS COMMENT FT1
Report given to Keila, Made aware patient will be arriving on a hospital bed . Patient is in no acute distress.

## 2019-02-09 NOTE — ED PROVIDER NOTE - MUSCULOSKELETAL, MLM
Spine appears normal, RLE-range of motion is limited, Rt thigh-swelling, tenderness to palp., ext rotated, shorter

## 2019-02-09 NOTE — ED ADULT NURSE NOTE - CHPI ED NUR SYMPTOMS NEG
no fever/no loss of consciousness/no numbness/no bleeding/no vomiting/no tingling/no weakness/no deformity/no confusion/no abrasion

## 2019-02-09 NOTE — ED PROVIDER NOTE - OBJECTIVE STATEMENT
68 y.o. female Adult home resident BIBA patient claims she fell in the shower and complains of R knee pain, unable to ambulate. As per transfer note from adult home, pt had an xray done on 2/8, reportedly with acute displaced R intertrochanteric fracture. Patient did not hit head when she fell.

## 2019-02-09 NOTE — H&P ADULT - NSHPPHYSICALEXAM_GEN_ALL_CORE
ICU Vital Signs Last 24 Hrs  T(C): 36.9 (09 Feb 2019 11:32), Max: 37.2 (09 Feb 2019 07:04)  T(F): 98.4 (09 Feb 2019 11:32), Max: 98.9 (09 Feb 2019 07:04)  HR: 81 (09 Feb 2019 11:32) (81 - 82)  BP: 102/54 (09 Feb 2019 11:32) (102/54 - 111/71)  BP(mean): --  ABP: --  ABP(mean): --  RR: 17 (09 Feb 2019 11:32) (16 - 17)  SpO2: 90% (09 Feb 2019 07:04) (90% - 90%)    PHYSICAL EXAM:  GENERAL: In mild distress, Obese  HEAD:  Atraumatic, Normocephalic  EYES:  conjunctiva and sclera clear  NECK: Supple, No JVD, Normal thyroid  CHEST/LUNG: Clear to auscultation. Clear to percussion bilaterally; No rales, rhonchi, wheezing, or rubs  HEART: Regular rate and rhythm; No murmurs, rubs, or gallops  ABDOMEN: Soft, Nontender, Nondistended; Bowel sounds present  NERVOUS SYSTEM:  Alert & Oriented X3,    EXTREMITIES:  2+ Peripheral Pulses, No clubbing, cyanosis, or edema  SKIN: warm dry ICU Vital Signs Last 24 Hrs  T(C): 36.9 (09 Feb 2019 11:32), Max: 37.2 (09 Feb 2019 07:04)  T(F): 98.4 (09 Feb 2019 11:32), Max: 98.9 (09 Feb 2019 07:04)  HR: 81 (09 Feb 2019 11:32) (81 - 82)  BP: 102/54 (09 Feb 2019 11:32) (102/54 - 111/71)  BP(mean): --  ABP: --  ABP(mean): --  RR: 17 (09 Feb 2019 11:32) (16 - 17)  SpO2: 90% (09 Feb 2019 07:04) (90% - 90%)    PHYSICAL EXAM:  GENERAL: In mild distress, Obese  HEAD:  Atraumatic, Normocephalic  EYES:  conjunctiva and sclera clear  NECK: Supple, No JVD, Normal thyroid  CHEST/LUNG: Clear to auscultation. Clear to percussion bilaterally; No rales, rhonchi, wheezing, or rubs  HEART: Regular rate and rhythm; No murmurs, rubs, or gallops  ABDOMEN: Soft, Nontender, Nondistended; Bowel sounds present  NERVOUS SYSTEM:  Alert & Oriented X1,    EXTREMITIES:  Left leg tenderness, 2+ Peripheral Pulses, No clubbing, cyanosis, or edema  SKIN: warm dry

## 2019-02-10 LAB
ALBUMIN SERPL ELPH-MCNC: 2.4 G/DL — LOW (ref 3.5–5)
ALP SERPL-CCNC: 74 U/L — SIGNIFICANT CHANGE UP (ref 40–120)
ALT FLD-CCNC: 16 U/L DA — SIGNIFICANT CHANGE UP (ref 10–60)
ANION GAP SERPL CALC-SCNC: 6 MMOL/L — SIGNIFICANT CHANGE UP (ref 5–17)
ANION GAP SERPL CALC-SCNC: 8 MMOL/L — SIGNIFICANT CHANGE UP (ref 5–17)
AST SERPL-CCNC: 10 U/L — SIGNIFICANT CHANGE UP (ref 10–40)
BASOPHILS # BLD AUTO: 0.1 K/UL — SIGNIFICANT CHANGE UP (ref 0–0.2)
BASOPHILS NFR BLD AUTO: 1.3 % — SIGNIFICANT CHANGE UP (ref 0–2)
BILIRUB SERPL-MCNC: 0.5 MG/DL — SIGNIFICANT CHANGE UP (ref 0.2–1.2)
BUN SERPL-MCNC: 15 MG/DL — SIGNIFICANT CHANGE UP (ref 7–18)
BUN SERPL-MCNC: 17 MG/DL — SIGNIFICANT CHANGE UP (ref 7–18)
CALCIUM SERPL-MCNC: 7.8 MG/DL — LOW (ref 8.4–10.5)
CALCIUM SERPL-MCNC: 8.2 MG/DL — LOW (ref 8.4–10.5)
CHLORIDE SERPL-SCNC: 108 MMOL/L — SIGNIFICANT CHANGE UP (ref 96–108)
CHLORIDE SERPL-SCNC: 109 MMOL/L — HIGH (ref 96–108)
CHOLEST SERPL-MCNC: 112 MG/DL — SIGNIFICANT CHANGE UP (ref 10–199)
CO2 SERPL-SCNC: 26 MMOL/L — SIGNIFICANT CHANGE UP (ref 22–31)
CO2 SERPL-SCNC: 27 MMOL/L — SIGNIFICANT CHANGE UP (ref 22–31)
CREAT SERPL-MCNC: 0.6 MG/DL — SIGNIFICANT CHANGE UP (ref 0.5–1.3)
CREAT SERPL-MCNC: 0.67 MG/DL — SIGNIFICANT CHANGE UP (ref 0.5–1.3)
EOSINOPHIL # BLD AUTO: 0.3 K/UL — SIGNIFICANT CHANGE UP (ref 0–0.5)
EOSINOPHIL NFR BLD AUTO: 2.8 % — SIGNIFICANT CHANGE UP (ref 0–6)
FOLATE SERPL-MCNC: 5.4 NG/ML — SIGNIFICANT CHANGE UP
GLUCOSE SERPL-MCNC: 112 MG/DL — HIGH (ref 70–99)
GLUCOSE SERPL-MCNC: 86 MG/DL — SIGNIFICANT CHANGE UP (ref 70–99)
HBA1C BLD-MCNC: 5.2 % — SIGNIFICANT CHANGE UP (ref 4–5.6)
HCT VFR BLD CALC: 26.6 % — LOW (ref 34.5–45)
HCT VFR BLD CALC: 32.4 % — LOW (ref 34.5–45)
HDLC SERPL-MCNC: 37 MG/DL — LOW
HGB BLD-MCNC: 10.3 G/DL — LOW (ref 11.5–15.5)
HGB BLD-MCNC: 8.4 G/DL — LOW (ref 11.5–15.5)
LIPID PNL WITH DIRECT LDL SERPL: 50 MG/DL — SIGNIFICANT CHANGE UP
LYMPHOCYTES # BLD AUTO: 3 K/UL — SIGNIFICANT CHANGE UP (ref 1–3.3)
LYMPHOCYTES # BLD AUTO: 33.8 % — SIGNIFICANT CHANGE UP (ref 13–44)
MAGNESIUM SERPL-MCNC: 2.3 MG/DL — SIGNIFICANT CHANGE UP (ref 1.6–2.6)
MCHC RBC-ENTMCNC: 28.9 PG — SIGNIFICANT CHANGE UP (ref 27–34)
MCHC RBC-ENTMCNC: 29.8 PG — SIGNIFICANT CHANGE UP (ref 27–34)
MCHC RBC-ENTMCNC: 31.7 GM/DL — LOW (ref 32–36)
MCHC RBC-ENTMCNC: 31.8 GM/DL — LOW (ref 32–36)
MCV RBC AUTO: 90.7 FL — SIGNIFICANT CHANGE UP (ref 80–100)
MCV RBC AUTO: 94.3 FL — SIGNIFICANT CHANGE UP (ref 80–100)
MONOCYTES # BLD AUTO: 1 K/UL — HIGH (ref 0–0.9)
MONOCYTES NFR BLD AUTO: 11.1 % — SIGNIFICANT CHANGE UP (ref 2–14)
NEUTROPHILS # BLD AUTO: 4.6 K/UL — SIGNIFICANT CHANGE UP (ref 1.8–7.4)
NEUTROPHILS NFR BLD AUTO: 51 % — SIGNIFICANT CHANGE UP (ref 43–77)
PHOSPHATE SERPL-MCNC: 4.2 MG/DL — SIGNIFICANT CHANGE UP (ref 2.5–4.5)
PLATELET # BLD AUTO: 394 K/UL — SIGNIFICANT CHANGE UP (ref 150–400)
PLATELET # BLD AUTO: 442 K/UL — HIGH (ref 150–400)
POTASSIUM SERPL-MCNC: 4.4 MMOL/L — SIGNIFICANT CHANGE UP (ref 3.5–5.3)
POTASSIUM SERPL-MCNC: 4.4 MMOL/L — SIGNIFICANT CHANGE UP (ref 3.5–5.3)
POTASSIUM SERPL-SCNC: 4.4 MMOL/L — SIGNIFICANT CHANGE UP (ref 3.5–5.3)
POTASSIUM SERPL-SCNC: 4.4 MMOL/L — SIGNIFICANT CHANGE UP (ref 3.5–5.3)
PROT SERPL-MCNC: 6 G/DL — SIGNIFICANT CHANGE UP (ref 6–8.3)
RBC # BLD: 2.82 M/UL — LOW (ref 3.8–5.2)
RBC # BLD: 3.58 M/UL — LOW (ref 3.8–5.2)
RBC # FLD: 14.8 % — HIGH (ref 10.3–14.5)
RBC # FLD: 15.6 % — HIGH (ref 10.3–14.5)
SODIUM SERPL-SCNC: 142 MMOL/L — SIGNIFICANT CHANGE UP (ref 135–145)
SODIUM SERPL-SCNC: 142 MMOL/L — SIGNIFICANT CHANGE UP (ref 135–145)
TOTAL CHOLESTEROL/HDL RATIO MEASUREMENT: 3 RATIO — LOW (ref 3.3–7.1)
TRIGL SERPL-MCNC: 124 MG/DL — SIGNIFICANT CHANGE UP (ref 10–149)
TSH SERPL-MCNC: 3.62 UU/ML — SIGNIFICANT CHANGE UP (ref 0.34–4.82)
VIT B12 SERPL-MCNC: 321 PG/ML — SIGNIFICANT CHANGE UP (ref 232–1245)
WBC # BLD: 13.4 K/UL — HIGH (ref 3.8–10.5)
WBC # BLD: 8.9 K/UL — SIGNIFICANT CHANGE UP (ref 3.8–10.5)
WBC # FLD AUTO: 13.4 K/UL — HIGH (ref 3.8–10.5)
WBC # FLD AUTO: 8.9 K/UL — SIGNIFICANT CHANGE UP (ref 3.8–10.5)

## 2019-02-10 PROCEDURE — 77071 MNL APPL STRS JT RADIOGRAPHY: CPT

## 2019-02-10 PROCEDURE — 76000 FLUOROSCOPY <1 HR PHYS/QHP: CPT | Mod: 26

## 2019-02-10 PROCEDURE — 27245 TREAT THIGH FRACTURE: CPT | Mod: AS,RT,59

## 2019-02-10 PROCEDURE — 27170 REPAIR/GRAFT FEMUR HEAD/NECK: CPT | Mod: AS,59,RT

## 2019-02-10 RX ORDER — OXYCODONE AND ACETAMINOPHEN 5; 325 MG/1; MG/1
1 TABLET ORAL EVERY 4 HOURS
Qty: 0 | Refills: 0 | Status: DISCONTINUED | OUTPATIENT
Start: 2019-02-10 | End: 2019-02-12

## 2019-02-10 RX ORDER — ONDANSETRON 8 MG/1
4 TABLET, FILM COATED ORAL ONCE
Qty: 0 | Refills: 0 | Status: DISCONTINUED | OUTPATIENT
Start: 2019-02-10 | End: 2019-02-10

## 2019-02-10 RX ORDER — SODIUM CHLORIDE 9 MG/ML
1000 INJECTION, SOLUTION INTRAVENOUS
Qty: 0 | Refills: 0 | Status: DISCONTINUED | OUTPATIENT
Start: 2019-02-10 | End: 2019-02-10

## 2019-02-10 RX ORDER — CEFAZOLIN SODIUM 1 G
1000 VIAL (EA) INJECTION EVERY 8 HOURS
Qty: 0 | Refills: 0 | Status: COMPLETED | OUTPATIENT
Start: 2019-02-11 | End: 2019-02-11

## 2019-02-10 RX ORDER — FENTANYL CITRATE 50 UG/ML
25 INJECTION INTRAVENOUS
Qty: 0 | Refills: 0 | Status: DISCONTINUED | OUTPATIENT
Start: 2019-02-10 | End: 2019-02-10

## 2019-02-10 RX ORDER — DOCUSATE SODIUM 100 MG
100 CAPSULE ORAL THREE TIMES A DAY
Qty: 0 | Refills: 0 | Status: DISCONTINUED | OUTPATIENT
Start: 2019-02-10 | End: 2019-02-12

## 2019-02-10 RX ORDER — HYDROMORPHONE HYDROCHLORIDE 2 MG/ML
0.5 INJECTION INTRAMUSCULAR; INTRAVENOUS; SUBCUTANEOUS
Qty: 0 | Refills: 0 | Status: DISCONTINUED | OUTPATIENT
Start: 2019-02-10 | End: 2019-02-10

## 2019-02-10 RX ORDER — ENOXAPARIN SODIUM 100 MG/ML
30 INJECTION SUBCUTANEOUS EVERY 12 HOURS
Qty: 0 | Refills: 0 | Status: DISCONTINUED | OUTPATIENT
Start: 2019-02-11 | End: 2019-02-12

## 2019-02-10 RX ORDER — SODIUM CHLORIDE 9 MG/ML
1000 INJECTION INTRAMUSCULAR; INTRAVENOUS; SUBCUTANEOUS
Qty: 0 | Refills: 0 | Status: DISCONTINUED | OUTPATIENT
Start: 2019-02-10 | End: 2019-02-12

## 2019-02-10 RX ADMIN — HYDROMORPHONE HYDROCHLORIDE 0.5 MILLIGRAM(S): 2 INJECTION INTRAMUSCULAR; INTRAVENOUS; SUBCUTANEOUS at 19:26

## 2019-02-10 RX ADMIN — PANTOPRAZOLE SODIUM 40 MILLIGRAM(S): 20 TABLET, DELAYED RELEASE ORAL at 07:32

## 2019-02-10 RX ADMIN — MORPHINE SULFATE 1 MILLIGRAM(S): 50 CAPSULE, EXTENDED RELEASE ORAL at 12:08

## 2019-02-10 RX ADMIN — OLANZAPINE 15 MILLIGRAM(S): 15 TABLET, FILM COATED ORAL at 12:26

## 2019-02-10 RX ADMIN — Medication 650 MILLIGRAM(S): at 03:25

## 2019-02-10 RX ADMIN — MORPHINE SULFATE 1 MILLIGRAM(S): 50 CAPSULE, EXTENDED RELEASE ORAL at 23:03

## 2019-02-10 RX ADMIN — Medication 0.5 MILLIGRAM(S): at 05:12

## 2019-02-10 RX ADMIN — Medication 50 MICROGRAM(S): at 05:13

## 2019-02-10 RX ADMIN — MORPHINE SULFATE 1 MILLIGRAM(S): 50 CAPSULE, EXTENDED RELEASE ORAL at 11:52

## 2019-02-10 RX ADMIN — Medication 81 MILLIGRAM(S): at 11:52

## 2019-02-10 RX ADMIN — HYDROMORPHONE HYDROCHLORIDE 0.5 MILLIGRAM(S): 2 INJECTION INTRAMUSCULAR; INTRAVENOUS; SUBCUTANEOUS at 19:40

## 2019-02-10 RX ADMIN — TRAMADOL HYDROCHLORIDE 25 MILLIGRAM(S): 50 TABLET ORAL at 00:10

## 2019-02-10 RX ADMIN — Medication 650 MILLIGRAM(S): at 02:24

## 2019-02-10 RX ADMIN — TRAMADOL HYDROCHLORIDE 25 MILLIGRAM(S): 50 TABLET ORAL at 06:13

## 2019-02-10 RX ADMIN — TRAMADOL HYDROCHLORIDE 25 MILLIGRAM(S): 50 TABLET ORAL at 05:13

## 2019-02-10 NOTE — BRIEF OPERATIVE NOTE - PROCEDURE
<<-----Click on this checkbox to enter Procedure Intramedullary nailing for fracture of right femur  02/10/2019    Active  MARCELLE

## 2019-02-10 NOTE — CHART NOTE - NSCHARTNOTEFT_GEN_A_CORE
PATIENT IS SCHEDULED FOR ORIF OF RIGHT FEMORAL NECK FRACTURE  - PATIENT IS MODERATE SURGICAL RISK.  - PPM IS DONE EVERY 3 MONTHS AT [facility] - UNEVENTFUL   - D/W ORTHO TEAM  - DR. CURIEL

## 2019-02-10 NOTE — CONSULT NOTE ADULT - ATTENDING COMMENTS
pt seen and examined.  displaced IT fx admit for IM nailing if medically cleared.  explained risks benefits and alteranatives to pt and family

## 2019-02-10 NOTE — BRIEF OPERATIVE NOTE - POST-OP DX
Intertrochanteric fracture of right femur, closed, initial encounter  02/10/2019    Active  Daren Batista

## 2019-02-10 NOTE — PROGRESS NOTE ADULT - PROBLEM SELECTOR PLAN 5
RISK                                                          Points  [  ] Previous VTE                                                3  [  ] Thrombophilia                                             2  [  ] Lower limb paralysis                                   2        (unable to hold up >15 seconds)    [  ] Current Cancer                                             2         (within 6 months)  [ x ] Immobilization > 24 hrs                              1  [  ] ICU/CCU stay > 24 hours                             1  [ x ] Age > 60                                                         1    IMPROVE VTE Score: 2  lovenox for VTE prophylaxis. RISK                                                          Points  [  ] Previous VTE                                                3  [  ] Thrombophilia                                             2  [  ] Lower limb paralysis                                   2        (unable to hold up >15 seconds)    [  ] Current Cancer                                             2         (within 6 months)  [ x ] Immobilization > 24 hrs                              1  [  ] ICU/CCU stay > 24 hours                             1  [ x ] Age > 60                                                         1    IMPROVE VTE Score: 2  Lovenox for VTE prophylaxis.

## 2019-02-10 NOTE — PROGRESS NOTE ADULT - ASSESSMENT
69 y/o F from [facility] with PMHx of anemia, bipolar disorder, COPD, GERD, HLD, hypothyroidism, osteopenia, pacemaker, chronic paranoia, and schizophrenia, Iron deificiency Anemia, depression, Dementia with behavioural problem, sent from [facility] s/p Fall in bathroom.     Patient is admitted for Comminuted mildly displaced intertrochanteric fracture right femoral neck.

## 2019-02-10 NOTE — PROGRESS NOTE ADULT - SUBJECTIVE AND OBJECTIVE BOX
PGY1 Note discussed with Supervising Resident and Primary Attending.    Patient is a 68y old  Female who presents with a chief complaint of S/P Fall (2019 10:48)      INTERVAL HPI/OVERNIGHT EVENTS :  No acute overnight events. Patient seen and examined at bedside. Patient has no current complaints. Patient denies nausea, vomiting, diarrhea, chest pain, SOB, headache.  MEDICATIONS  (STANDING):  aspirin  chewable 81 milliGRAM(s) Oral daily  clonazePAM Tablet 0.5 milliGRAM(s) Oral two times a day  enoxaparin Injectable 40 milliGRAM(s) SubCutaneous daily  lactulose Syrup 20 Gram(s) Oral at bedtime  levothyroxine 50 MICROGram(s) Oral daily  OLANZapine 15 milliGRAM(s) Oral daily  pantoprazole    Tablet 40 milliGRAM(s) Oral before breakfast  valproic  acid Syrup 1500 milliGRAM(s) Oral <User Schedule>    MEDICATIONS  (PRN):  acetaminophen   Tablet .. 650 milliGRAM(s) Oral every 6 hours PRN Mild Pain (1 - 3)  morphine  - Injectable 1 milliGRAM(s) IV Push every 6 hours PRN Severe Pain (7 - 10)  traMADol 25 milliGRAM(s) Oral every 6 hours PRN Moderate Pain (4 - 6)      Allergies    shellfish (Unknown)  shellfish. (Hives; Urticaria)    Intolerances        REVIEW OF SYSTEMS :  * General: denies chills, fatigue  * Eyes: denies vision changes  * Respiratory: denies cough, SOB, wheezing  * Cardio: denies chest pain, palpitations  * GI: denies abd pain, nausea, vomiting, diarrhea  * : denies dysuria  * Neuro: denies headaches, numbness, weakness  * MSK: denies joint pain  * Skin: denies itching, rashes    Vital Signs Last 24 Hrs  T(C): 37 (2019 21:39), Max: 37.2 (2019 07:04)  T(F): 98.6 (2019 21:39), Max: 99 (2019 20:47)  HR: 81 (2019 21:39) (70 - 83)  BP: 100/48 (2019 21:39) (100/48 - 112/57)  BP(mean): --  RR: 17 (2019 21:39) (16 - 18)  SpO2: 96% (2019 21:39) (90% - 99%)    PHYSICAL EXAM :  * General: no acute distress, well-nourished, well-developed  * HEENT: atraumatic, normocephalic; moist mucus membranes; supple neck; no JVD  * CN: EOMI, PERRL  * Respiratory: cta b/l; no wheezes, rales, rhonchi  * Cardio: RRR; no appreciable murmurs, rubs, gallops  * Abd: soft, nontender, nondistended, +BS  * Neuro: AAOx3; strength 5/5; sensation intact throughout  * Extremities: no clubbing, cyanosis, edema  * Skin: no rashes    LABS:                          9.0    12.1  )-----------( 375      ( 2019 09:52 )             28.0     02-    140  |  106  |  12  ----------------------------<  103<H>  4.3   |  27  |  0.54    Ca    8.5      2019 09:55    TPro  6.2  /  Alb  2.6<L>  /  TBili  0.7  /  DBili  x   /  AST  13  /  ALT  21  /  AlkPhos  80  02-09    PT/INR - ( 2019 09:55 )   PT: 12.8 sec;   INR: 1.15 ratio         PTT - ( 2019 09:55 )  PTT:36.5 sec  Urinalysis Basic - ( 2019 10:22 )    Color: Yellow / Appearance: Clear / S.010 / pH: x  Gluc: x / Ketone: Negative  / Bili: Negative / Urobili: Negative   Blood: x / Protein: Negative / Nitrite: Negative   Leuk Esterase: Small / RBC: 0-2 /HPF / WBC 6-10 /HPF   Sq Epi: x / Non Sq Epi: Occasional /HPF / Bacteria: x      CAPILLARY BLOOD GLUCOSE          RADIOLOGY & ADDITIONAL TESTS:   no new imaging    Imaging Personally Reviewed:    Consultant(s) Notes Reviewed: PGY1 Note discussed with Supervising Resident and Primary Attending.    Patient is a 68y old  Female who presents with a chief complaint of S/P Fall (2019 10:48)      INTERVAL HPI/OVERNIGHT EVENTS:  Patient was having some pain overnight, controlled with prn morphine and tramadol. Patient seen and examined at bedside. Patient still reporting some hip pain but otherwise has no complaints.    MEDICATIONS  (STANDING):  aspirin  chewable 81 milliGRAM(s) Oral daily  clonazePAM Tablet 0.5 milliGRAM(s) Oral two times a day  enoxaparin Injectable 40 milliGRAM(s) SubCutaneous daily  lactulose Syrup 20 Gram(s) Oral at bedtime  levothyroxine 50 MICROGram(s) Oral daily  OLANZapine 15 milliGRAM(s) Oral daily  pantoprazole    Tablet 40 milliGRAM(s) Oral before breakfast  valproic  acid Syrup 1500 milliGRAM(s) Oral <User Schedule>    MEDICATIONS  (PRN):  acetaminophen   Tablet .. 650 milliGRAM(s) Oral every 6 hours PRN Mild Pain (1 - 3)  morphine  - Injectable 1 milliGRAM(s) IV Push every 6 hours PRN Severe Pain (7 - 10)  traMADol 25 milliGRAM(s) Oral every 6 hours PRN Moderate Pain (4 - 6)      Allergies    shellfish (Unknown)  shellfish. (Hives; Urticaria)    Intolerances        REVIEW OF SYSTEMS:  * General: denies chills, fatigue  * Eyes: denies vision changes  * Respiratory: denies cough, SOB, wheezing  * Cardio: denies chest pain, palpitations  * GI: denies abd pain, nausea, vomiting, diarrhea  * : denies dysuria  * Neuro: denies headaches, numbness, weakness  * MSK: reports hip pain; denies joint pain  * Skin: denies itching, rashes    Vital Signs Last 24 Hrs  T(C): 37 (2019 21:39), Max: 37.2 (2019 07:04)  T(F): 98.6 (2019 21:39), Max: 99 (2019 20:47)  HR: 81 (2019 21:39) (70 - 83)  BP: 100/48 (2019 21:39) (100/48 - 112/57)  BP(mean): --  RR: 17 (2019 21:39) (16 - 18)  SpO2: 96% (2019 21:39) (90% - 99%)    PHYSICAL EXAM:  * General: no acute distress, well-nourished, well-developed  * HEENT: atraumatic, normocephalic; moist mucus membranes; supple neck; no JVD  * CN: EOMI, PERRL  * Respiratory: cta b/l; no wheezes, rales, rhonchi  * Cardio: RRR; no appreciable murmurs, rubs, gallops  * Abd: soft, nontender, nondistended, +BS  * Neuro: AAOx1-2; sensation intact throughout  * Extremities: no clubbing, cyanosis, edema  * Skin: no rashes    LABS:                          9.0    12.1  )-----------( 375      ( 2019 09:52 )             28.0         140  |  106  |  12  ----------------------------<  103<H>  4.3   |  27  |  0.54    Ca    8.5      2019 09:55    TPro  6.2  /  Alb  2.6<L>  /  TBili  0.7  /  DBili  x   /  AST  13  /  ALT  21  /  AlkPhos  80  02-09    PT/INR - ( 2019 09:55 )   PT: 12.8 sec;   INR: 1.15 ratio         PTT - ( 2019 09:55 )  PTT:36.5 sec  Urinalysis Basic - ( 2019 10:22 )    Color: Yellow / Appearance: Clear / S.010 / pH: x  Gluc: x / Ketone: Negative  / Bili: Negative / Urobili: Negative   Blood: x / Protein: Negative / Nitrite: Negative   Leuk Esterase: Small / RBC: 0-2 /HPF / WBC 6-10 /HPF   Sq Epi: x / Non Sq Epi: Occasional /HPF / Bacteria: x      CAPILLARY BLOOD GLUCOSE          RADIOLOGY & ADDITIONAL TESTS:   no new imaging    Consultant(s) Notes Reviewed: no new consults

## 2019-02-10 NOTE — CONSULT NOTE ADULT - SUBJECTIVE AND OBJECTIVE BOX
Pt Name: CLINT COLLADO  MRN: [id number]    ORTHOPEDIC CONSULT:    Orthopedic diagnosis: Right hip intertrochanteric fracture    68yFemaleHPI:  69 y/o F from [facility], wheelchair bound with PMHx of anemia, bipolar disorder, COPD, GERD, HLD, hypothyroidism, osteopenia, pacemaker, chronic paranoia, and schizophrenia, Iron deificiency Anemia, depression, Dementia with behavioural problem, sent from [facility] s/p Fall in bathroom. Patient is AXO 1= poor historian, states she fell in bathroom, she has knee pain. Further hx is limited.      Patient is a 69 y/o F from [facility] who is wheelchair bound for the past 3 years who presents with right hip pain s/p fall. Patient is a poor historian, states that she "fell in the shower on Monday". Patient states the pain is localized to the right hip, non-radiating. Pt denies Chest pain, SOB, dyspnea, paresthesias, N/V/D, abdominal pain, syncope, or pain anywhere else.     AMBULATION: Baseline Ambulation [ ] independent [ ] With Cane [ ] With Walker [ ]  Bedbound [ x ]  Wheelchair    PAST MEDICAL & SURGICAL HISTORY:  Anemia  GERD (gastroesophageal reflux disease)  COPD (chronic obstructive pulmonary disease)  Paranoia: CHRONIC  Schizophrenia  Osteopenia  Hypothyroidism  Hypercholesteremia  Bipolar Disorder  Pacemaker  No significant past surgical history      ALLERGIES: shellfish (Unknown)  shellfish. (Hives; Urticaria)      MEDICATIONS: acetaminophen   Tablet .. 650 milliGRAM(s) Oral every 6 hours PRN  aspirin  chewable 81 milliGRAM(s) Oral daily  clonazePAM Tablet 0.5 milliGRAM(s) Oral two times a day  enoxaparin Injectable 40 milliGRAM(s) SubCutaneous daily  lactulose Syrup 20 Gram(s) Oral at bedtime  levothyroxine 50 MICROGram(s) Oral daily  morphine  - Injectable 1 milliGRAM(s) IV Push every 6 hours PRN  OLANZapine 15 milliGRAM(s) Oral daily  pantoprazole    Tablet 40 milliGRAM(s) Oral before breakfast  traMADol 25 milliGRAM(s) Oral every 6 hours PRN  valproic  acid Syrup 1500 milliGRAM(s) Oral <User Schedule>      PHYSICAL EXAM:    Vital Signs Last 24 Hrs  T(C): 36.7 (10 Feb 2019 05:11), Max: 37.2 (09 Feb 2019 20:47)  T(F): 98 (10 Feb 2019 05:11), Max: 99 (09 Feb 2019 20:47)  HR: 57 (10 Feb 2019 05:11) (57 - 83)  BP: 98/51 (10 Feb 2019 05:11) (97/48 - 112/57)  BP(mean): --  RR: 18 (10 Feb 2019 05:11) (17 - 18)  SpO2: 97% (10 Feb 2019 05:11) (96% - 99%)    Gen: well developed, well nourished,   Musculoskeletal:    RLE: Skin warm and pink. Leg shortened and externally rotated. No open wounds. No ecchymosis. TTP over right hip. Hip ROM limited 2/2 pain. NVI. SILT  Lower extremities: Calves soft and NTTP b/l. (+)EHL/FHL/ADF/APF intact b/l. NVI. SILT.        LABS:                        8.4    8.9   )-----------( 394      ( 10 Feb 2019 07:18 )             26.6     02-10    142  |  108  |  17  ----------------------------<  86  4.4   |  26  |  0.67    Ca    8.2<L>      10 Feb 2019 07:18  Phos  4.2     02-10  Mg     2.3     02-10    TPro  6.0  /  Alb  2.4<L>  /  TBili  0.5  /  DBili  x   /  AST  10  /  ALT  16  /  AlkPhos  74  02-10    PT/INR - ( 09 Feb 2019 09:55 )   PT: 12.8 sec;   INR: 1.15 ratio         PTT - ( 09 Feb 2019 09:55 )  PTT:36.5 sec    RADIOLOGY:   EXAM:  XR HIPS BI WITH PELV 2V                          PROCEDURE DATE:  02/09/2019      INTERPRETATION:  CLINICAL HISTORY: 68 years year old Female with s/p   fall, naveen hip pain 9.    COMPARISON: None    AP and frog-leg lateral views of the right and left hip were obtained   with AP view of the pelvis.    There is a comminuted mildly displaced intertrochanteric fracture of the   right femoral neck. There is a 1 cm displaced fragment near the inferior   pubic ramus. The joint space is maintained. The pubic rami are intact.    The left hip demonstrates no fracture, subluxation or dislocation. The   pubic rami are intact.    The remainder of the osseous pelvis demonstrates no fracture or lytic or   blastic lesion. There are mild degenerative changes of both sacroiliac   joints. Degenerative changes lower lumbar sacral spine are present.    Iliac crest enthesophytes are noted..    There is diffuse osteoporosis.    IMPRESSION:    Comminuted mildly displaced intertrochanteric fracture right femoral   neck. Osteoporosis.    Findings discussed with Dr. Hazel Nunes at 2/9/2019 9:19 AM with   readback.      IMPRESSION: Pt is a  68y Female with right hip intertrochanteric fracture    PLAN:  -  Recommendation:  [ X ] Surgical treatment/fixation  -  Pain control  -  DVT prophylaxis with venodynes  -  Fracture care, bedrest, NWB to affected extremity  -  Keep patient NPO today for surgery  -  Hold all anticoagulation today  -  Surgeon: Dr. Iverson  -  Procedure: Right hip intramedullary nailing  -  Medical optimization  -  Recommend transfusing 1 unit pRBC prior to OR  -  Consent: to be obtained .   Emergency contact : Avi Collado (brother) : [phone]  -  Spoke with patients brother Avi Collado. explained surgical vs non-surgical options, risks and benefits and he has made the decision for the patient to undergo surgery  -  Case d/w Dr. Iverson

## 2019-02-10 NOTE — PROGRESS NOTE ADULT - PROBLEM SELECTOR PLAN 1
s/p fall, complaining of leg pain likely mechanical fall  Xray shows " Comminuted mildly displaced intertrochanteric fracture right femoral neck."  c/w morphine, tyelnol, tramadol  Ortho consulted Dr. Iverson S/p fall, complaining of leg pain, likely mechanical fall  - Hip xray shows comminuted mildly displaced intertrochanteric fracture right femoral neck  - Knee xray negative for fracture  - C/w morphine, tylenol, tramadol for pain control  - Ortho Dr Iverson

## 2019-02-11 LAB
ANION GAP SERPL CALC-SCNC: 8 MMOL/L — SIGNIFICANT CHANGE UP (ref 5–17)
BUN SERPL-MCNC: 15 MG/DL — SIGNIFICANT CHANGE UP (ref 7–18)
CALCIUM SERPL-MCNC: 8.1 MG/DL — LOW (ref 8.4–10.5)
CHLORIDE SERPL-SCNC: 107 MMOL/L — SIGNIFICANT CHANGE UP (ref 96–108)
CO2 SERPL-SCNC: 25 MMOL/L — SIGNIFICANT CHANGE UP (ref 22–31)
CREAT SERPL-MCNC: 0.48 MG/DL — LOW (ref 0.5–1.3)
CULTURE RESULTS: SIGNIFICANT CHANGE UP
GLUCOSE SERPL-MCNC: 99 MG/DL — SIGNIFICANT CHANGE UP (ref 70–99)
HCT VFR BLD CALC: 30.5 % — LOW (ref 34.5–45)
HGB BLD-MCNC: 10 G/DL — LOW (ref 11.5–15.5)
MCHC RBC-ENTMCNC: 29.7 PG — SIGNIFICANT CHANGE UP (ref 27–34)
MCHC RBC-ENTMCNC: 32.7 GM/DL — SIGNIFICANT CHANGE UP (ref 32–36)
MCV RBC AUTO: 90.8 FL — SIGNIFICANT CHANGE UP (ref 80–100)
PLATELET # BLD AUTO: 407 K/UL — HIGH (ref 150–400)
POTASSIUM SERPL-MCNC: 3.8 MMOL/L — SIGNIFICANT CHANGE UP (ref 3.5–5.3)
POTASSIUM SERPL-SCNC: 3.8 MMOL/L — SIGNIFICANT CHANGE UP (ref 3.5–5.3)
RBC # BLD: 3.36 M/UL — LOW (ref 3.8–5.2)
RBC # FLD: 15.8 % — HIGH (ref 10.3–14.5)
SODIUM SERPL-SCNC: 140 MMOL/L — SIGNIFICANT CHANGE UP (ref 135–145)
SPECIMEN SOURCE: SIGNIFICANT CHANGE UP
WBC # BLD: 10.6 K/UL — HIGH (ref 3.8–10.5)
WBC # FLD AUTO: 10.6 K/UL — HIGH (ref 3.8–10.5)

## 2019-02-11 RX ADMIN — Medication 100 MILLIGRAM(S): at 06:11

## 2019-02-11 RX ADMIN — Medication 81 MILLIGRAM(S): at 12:27

## 2019-02-11 RX ADMIN — OLANZAPINE 15 MILLIGRAM(S): 15 TABLET, FILM COATED ORAL at 12:27

## 2019-02-11 RX ADMIN — TRAMADOL HYDROCHLORIDE 25 MILLIGRAM(S): 50 TABLET ORAL at 06:12

## 2019-02-11 RX ADMIN — Medication 100 MILLIGRAM(S): at 13:44

## 2019-02-11 RX ADMIN — Medication 100 MILLIGRAM(S): at 01:41

## 2019-02-11 RX ADMIN — ENOXAPARIN SODIUM 30 MILLIGRAM(S): 100 INJECTION SUBCUTANEOUS at 18:58

## 2019-02-11 RX ADMIN — TRAMADOL HYDROCHLORIDE 25 MILLIGRAM(S): 50 TABLET ORAL at 06:19

## 2019-02-11 RX ADMIN — Medication 100 MILLIGRAM(S): at 08:43

## 2019-02-11 RX ADMIN — Medication 0.5 MILLIGRAM(S): at 06:21

## 2019-02-11 RX ADMIN — LACTULOSE 20 GRAM(S): 10 SOLUTION ORAL at 21:08

## 2019-02-11 RX ADMIN — Medication 50 MICROGRAM(S): at 06:11

## 2019-02-11 RX ADMIN — Medication 1500 MILLIGRAM(S): at 18:59

## 2019-02-11 RX ADMIN — MORPHINE SULFATE 1 MILLIGRAM(S): 50 CAPSULE, EXTENDED RELEASE ORAL at 01:20

## 2019-02-11 RX ADMIN — TRAMADOL HYDROCHLORIDE 25 MILLIGRAM(S): 50 TABLET ORAL at 14:20

## 2019-02-11 RX ADMIN — Medication 100 MILLIGRAM(S): at 21:08

## 2019-02-11 RX ADMIN — TRAMADOL HYDROCHLORIDE 25 MILLIGRAM(S): 50 TABLET ORAL at 13:51

## 2019-02-11 RX ADMIN — ENOXAPARIN SODIUM 30 MILLIGRAM(S): 100 INJECTION SUBCUTANEOUS at 06:12

## 2019-02-11 RX ADMIN — PANTOPRAZOLE SODIUM 40 MILLIGRAM(S): 20 TABLET, DELAYED RELEASE ORAL at 06:11

## 2019-02-11 NOTE — SWALLOW BEDSIDE ASSESSMENT ADULT - ASR SWALLOW ASPIRATION MONITOR
cough/fever/gurgly voice/pneumonia/upper respiratory infection/change of breathing pattern/throat clearing

## 2019-02-11 NOTE — SWALLOW BEDSIDE ASSESSMENT ADULT - COMMENTS
Pt awake & alert, verbally confirmed name +confused; HOB elevated to 45° As pt p/w missing dentition +decreased safety awareness, PO trials were discontinued following mech soft as a regular consistency diet would reduced pt's swallow efficiency & safety.

## 2019-02-11 NOTE — PHYSICAL THERAPY INITIAL EVALUATION ADULT - GENERAL OBSERVATIONS, REHAB EVAL
pt alert verbally responsive highly motivated for PT mobilization, s/p R hip im nailing, tolerating assisted rw transfers and bedside ambulation with assist of 2 for safety

## 2019-02-11 NOTE — SWALLOW BEDSIDE ASSESSMENT ADULT - SWALLOW EVAL: DIAGNOSIS
Pt p/w s&s oropharyngeal dysphagia, c/b anterior spillage 2/2 weak labial seal,  delayed initiation of pharyngeal swallow initation,reduced hyoid excursion &multiple swallows. No Overt s&s of penetration/aspiration observed during this eval.

## 2019-02-11 NOTE — ADVANCED PRACTICE NURSE CONSULT - RECOMMEDATIONS
-Clean all affected areas with normal saline and apply skin prep to the surrounding skin  -Leave the Bilateral Heels open to air  -Apply TRIAD Moisture Barrier Cream to the Perianal and Gluteal Fold areas b.i.d. PRN  -Frequent toileting  -Elevate/float the patients heels using heel protectors and reposition the patient Q 2hrs using wedges or  pillows

## 2019-02-11 NOTE — PROGRESS NOTE ADULT - SUBJECTIVE AND OBJECTIVE BOX
Patient is a 68y old  Female who presents with a chief complaint of S/P Fall (2019 07:50)    PATIENT IS SEEN AND EXAMINED IN SURGICAL FLOOR.    ALLERGIES:  shellfish (Unknown)  shellfish. (Hives; Urticaria)    Daily Weight in k.3 (2019 17:04)    VITALS:    Vital Signs Last 24 Hrs  T(C): 37.2 (2019 14:19), Max: 37.2 (2019 14:19)  T(F): 99 (2019 14:19), Max: 99 (2019 14:19)  HR: 101 (2019 14:19) (95 - 107)  BP: 108/90 (2019 14:19) (108/90 - 131/82)  BP(mean): --  RR: 18 (2019 14:19) (18 - 20)  SpO2: 94% (2019 14:19) (94% - 99%)    LABS:    CBC Full  -  ( 2019 07:26 )  WBC Count : 10.6 K/uL  Hemoglobin : 10.0 g/dL  Hematocrit : 30.5 %  Platelet Count - Automated : 407 K/uL  Mean Cell Volume : 90.8 fl  Mean Cell Hemoglobin : 29.7 pg  Mean Cell Hemoglobin Concentration : 32.7 gm/dL  Auto Neutrophil # : x  Auto Lymphocyte # : x  Auto Monocyte # : x  Auto Eosinophil # : x  Auto Basophil # : x  Auto Neutrophil % : x  Auto Lymphocyte % : x  Auto Monocyte % : x  Auto Eosinophil % : x  Auto Basophil % : x      02-11    140  |  107  |  15  ----------------------------<  99  3.8   |  25  |  0.48<L>    Ca    8.1<L>      2019 07:26  Phos  4.2     02-10  Mg     2.3     02-10    TPro  6.0  /  Alb  2.4<L>  /  TBili  0.5  /  DBili  x   /  AST  10  /  ALT  16  /  AlkPhos  74  02-10      LIVER FUNCTIONS - ( 10 Feb 2019 07:18 )  Alb: 2.4 g/dL / Pro: 6.0 g/dL / ALK PHOS: 74 U/L / ALT: 16 U/L DA / AST: 10 U/L / GGT: x           Creatinine Trend: 0.48<--, 0.60<--, 0.67<--, 0.54<--  I&O's Summary    10 Feb 2019 07:  -  2019 07:00  --------------------------------------------------------  IN: 2150 mL / OUT: 825 mL / NET: 1325 mL    2019 07:01  -  2019 20:28  --------------------------------------------------------  IN: 0 mL / OUT: 200 mL / NET: -200 mL    .Urine   @ 21:58   <10,000 CFU/ml Normal Urogenital abelardo present  --  --    MEDICATIONS:    MEDICATIONS  (STANDING):  aspirin  chewable 81 milliGRAM(s) Oral daily  clonazePAM Tablet 0.5 milliGRAM(s) Oral two times a day  docusate sodium 100 milliGRAM(s) Oral three times a day  enoxaparin Injectable 30 milliGRAM(s) SubCutaneous every 12 hours  lactulose Syrup 20 Gram(s) Oral at bedtime  levothyroxine 50 MICROGram(s) Oral daily  OLANZapine 15 milliGRAM(s) Oral daily  pantoprazole    Tablet 40 milliGRAM(s) Oral before breakfast  sodium chloride 0.9%. 1000 milliLiter(s) (60 mL/Hr) IV Continuous <Continuous>  valproic  acid Syrup 1500 milliGRAM(s) Oral <User Schedule>      MEDICATIONS  (PRN):  acetaminophen   Tablet .. 650 milliGRAM(s) Oral every 6 hours PRN Mild Pain (1 - 3)  oxyCODONE    5 mG/acetaminophen 325 mG 1 Tablet(s) Oral every 4 hours PRN Mild Pain (1 - 3)  traMADol 25 milliGRAM(s) Oral every 6 hours PRN Moderate Pain (4 - 6)      REVIEW OF SYSTEMS:                           ALL ROS DONE [ X   ]    CONSTITUTIONAL:  LETHARGIC [   ], FEVER [   ], UNRESPONSIVE [   ]  CVS:  CP  [   ], SOB, [   ], PALPITATIONS [   ], DIZZYNESS [   ]  RS: COUGH [   ], SPUTUM [   ]  GI: ABDOMINAL PAIN [   ], NAUSEA [   ], VOMITINGS [   ], DIARRHEA [   ], CONSTIPATION [   ]  :  DYSURIA [   ], NOCTURIA [   ], INCREASED FREQUENCY [   ], DRIBLING [   ],  SKELETAL: PAINFUL JOINTS [   ], SWOLLEN JOINTS [   ], NECK ACHE [   ], LOW BACK ACHE [   ],  SKIN : ULCERS [   ], RASH [   ], ITCHING [   ]  CNS: HEAD ACHE [   ], DOUBLE VISION [   ], BLURRED VISION [   ], AMS / CONFUSION [   ], SEIZURES [   ], WEAKNESS [   ],TINGLING / NUMBNESS [   ]    PHYSICAL EXAMINATION:  GENERAL APPEARANCE: NO DISTRESS  HEENT:  NO PALLOR, NO  JVD,  NO   NODES, NECK SUPPLE  CVS: S1 +, S2 +,   RS: AEEB,  OCCASIONAL  RALES +,   NO RONCHI  ABD: SOFT, NT, NO, BS +  EXT: NO PE  SKIN: WARM,   SKELETAL:  ROM PAINFUL AT RIGHT HIP  CNS:  AAO X 1-2   ,   DEFICITS    RADIOLOGY :    < from: Xray Hip w/ Pelvis 2 Views, Bilateral (19 @ 08:59) >  IMPRESSION:    Comminuted mildly displaced intertrochanteric fracture right femoral   neck. Osteoporosis.    < end of copied text >        ASSESSMENT :     Closed fracture of neck of femur  Anemia  GERD (gastroesophageal reflux disease)  COPD (chronic obstructive pulmonary disease)  Paranoia  Schizophrenia  Osteopenia  MR (Mental Retardation)  Hypothyroidism  Hypercholesteremia  Bipolar Disorder  Pacemaker      PLAN:  HPI:  67 y/o F from [facility], wheelchair bound with PMHx of anemia, bipolar disorder, COPD, GERD, HLD, hypothyroidism, osteopenia, pacemaker, chronic paranoia, and schizophrenia, Iron deificiency Anemia, depression, Dementia with behavioural problem, sent from [facility] s/p Fall in bathroom. Patient is AXO 1= poor historian, states she fell in bathroom, she has knee pain. Further hx is limited.    In ED, patient's vital signs were stable. Labs were remarkable for WBC:12.1, Hb:9.0. Xray of Knee shows" Osteoporosis. No fracture." CXR: Mild pulmonary vascular congestion. Cardiomegaly. Pacemaker. Xray of hip shows " Comminuted mildly displaced intertrochanteric fracture right femoral neck. Osteoporosis."    Goals of care mentioned in [facility] chart: Full code. (2019 10:48)    - INTERTROCHANTERIC FRACTURE OF RIGHT HIP - S/P IM NAILING. PAIN Rx AND DVT PROPHYLAXIS   - DC PLAN TO [facility] IN AM  -

## 2019-02-11 NOTE — ADVANCED PRACTICE NURSE CONSULT - ASSESSMENT
This is a 68yr old female patient admitted for Femur, Fracture, presenting with the following:  -There is a R. Heel Stage 2 Pressure Injury, as evident as an intact fluid filled Bullae  -There is a Stage 1 Pressure Injury to the L. Heel, as evident by non-blanchable erythema  -There is evidence of Moisture Associated Skin Damage to the Perianal and Gluteal Fold areas

## 2019-02-11 NOTE — SWALLOW BEDSIDE ASSESSMENT ADULT - NS SPL SWALLOW CLINIC TRIAL FT
Pt denied ice chip trials, therefore the clinician began PO trials with nectar thick liquids to ensure safety.

## 2019-02-11 NOTE — PROGRESS NOTE ADULT - SUBJECTIVE AND OBJECTIVE BOX
68yFemale    Diagnosis:  S/p Right Hip IM Nailing POD#1    Patient was seen and evaluated at bedside. Patient with no acute complaints.   Pain is well controlled.   Denies CP/SOB, dyspnea, paresthesias, N/V/D, palpitations.     Vital Signs Last 24 Hrs  T(C): 36.9 (10 Feb 2019 20:56), Max: 37.1 (10 Feb 2019 19:10)  T(F): 98.4 (10 Feb 2019 20:56), Max: 98.8 (10 Feb 2019 19:10)  HR: 107 (10 Feb 2019 20:56) (72 - 107)  BP: 131/82 (10 Feb 2019 20:56) (94/41 - 138/94)  BP(mean): --  RR: 20 (10 Feb 2019 20:56) (17 - 20)  SpO2: 99% (10 Feb 2019 20:56) (96% - 100%)  I&O's Detail    10 Feb 2019 07:01  -  11 Feb 2019 07:00  --------------------------------------------------------  IN:    Lactated Ringers IV Bolus: 1800 mL    Packed Red Blood Cells: 350 mL  Total IN: 2150 mL    OUT:    Estimated Blood Loss: 50 mL    Indwelling Catheter - Urethral: 775 mL  Total OUT: 825 mL    Total NET: 1325 mL    Physical Exam:  General: AAOx3, NAD, resting comfortably in bed.  Right Hip:  Dressing is C/D/I. Skin is pink and warm. No erythema. SILT. Wound with no drainage, healing well.   Lower extremity:  No calf tenderness, calves are soft. 2+pulses. NVI. (+)EHL/FHL/ADF/APF intact b/l.  Good capillary refill. Warm, well-perfused.                           10.3   13.4  )-----------( 442      ( 10 Feb 2019 19:34 )             32.4     02-10    142  |  109<H>  |  15  ----------------------------<  112<H>  4.4   |  27  |  0.60    Ca    7.8<L>      10 Feb 2019 19:34  Phos  4.2     02-10  Mg     2.3     02-10    TPro  6.0  /  Alb  2.4<L>  /  TBili  0.5  /  DBili  x   /  AST  10  /  ALT  16  /  AlkPhos  74  02-10      Impression:  68yFemale S/p Right Hip IM Nailing POD#1  Plan:  -  Pain management  -  DVT prophylaxis with lovenox and venodynes  -  Daily Physical Therapy  WBAT of the right lower extremity  -  Discharge planning: Home Vs. Rehab pending Physical therapy eval.  -  Continue with Post-op Antibiotics x 24hrs  -  Continue with care as per medicine  -  Discontinue [place] catheter today  -  Case d/w DR. Iverson

## 2019-02-11 NOTE — SWALLOW BEDSIDE ASSESSMENT ADULT - ASR SWALLOW LINGUAL MOBILITY
Rest tremors observed during lingual protrusion; decreased ROM/impaired protrusion/impaired left lateral movement/impaired right lateral movement

## 2019-02-11 NOTE — PHYSICAL THERAPY INITIAL EVALUATION ADULT - IMPAIRMENTS FOUND, PT EVAL
gait, locomotion, and balance/muscle strength/aerobic capacity/endurance/joint integrity and mobility/gross motor/ROM

## 2019-02-11 NOTE — DIETITIAN INITIAL EVALUATION ADULT. - PERTINENT LABORATORY DATA
02-11 Na140 mmol/L Glu 99 mg/dL K+ 3.8 mmol/L Cr  0.48 mg/dL<L> BUN 15 mg/dL 02-10 Phos 4.2 mg/dL 02-10 Alb 2.4 g/dL<L> 02-10 [id number] 5.2 % 02-10 Chol 112 mg/dL LDL 50 mg/dL HDL 37 mg/dL<L> Trig 124 mg/dL

## 2019-02-11 NOTE — SWALLOW BEDSIDE ASSESSMENT ADULT - ORAL PHASE
suspected lingual stasis suspected lingual stasis; prolonged bolus holding suspected lingual transit

## 2019-02-11 NOTE — SWALLOW BEDSIDE ASSESSMENT ADULT - PHARYNGEAL PHASE
reduced hyoid excursion/Multiple swallows Multiple swallows/Reduced hyoid excursion; Pt required verbal cueing to swallow food in mouth prior to talking Multiple swallows/reduce hyoid excursion

## 2019-02-11 NOTE — SWALLOW BEDSIDE ASSESSMENT ADULT - ORAL PREPARATORY PHASE
Bolus falls into anterior sulcus/x2 anterior loss of bolus Within functional limits x2 reduced mastication ability

## 2019-02-11 NOTE — DIETITIAN INITIAL EVALUATION ADULT. - OTHER INFO
RN reports pt with good intake with feeding assistance. Multiple pressure injuries Stage 2 and 1 RN reports pt with good intake with feeding assistance. Multiple pressure injuries Stage 2 and 1. Pt s/p SLP. Brief discussion with SLP.

## 2019-02-11 NOTE — SWALLOW BEDSIDE ASSESSMENT ADULT - SLP PERTINENT HISTORY OF CURRENT PROBLEM
69 y/o from [facility] w/ PMHx of bipolar disorder, COPD, GERD, HLD, hypothyroidism, osteopenia, pacemaker, chronic paranoia, schizophrenia, iron deficiency Anemia, depression, Dementia w/ behavioural component. Sent to this facility (2/9/19) s/p Fall in bathroom. Pt states she fell in bathroom & had knee pain., upon admission

## 2019-02-11 NOTE — SWALLOW BEDSIDE ASSESSMENT ADULT - MODE OF PRESENTATION
fed by clinician/x 3 tsp x2 cup-sips; pt refused further trials or nectar spoon/Pt denied further trials of applesauce/fed by clinician spoon/fed by clinician

## 2019-02-11 NOTE — SWALLOW BEDSIDE ASSESSMENT ADULT - SWALLOW EVAL: RECOMMENDED FEEDING/EATING TECHNIQUES
maintain upright posture during/after eating for 30 mins/oral hygiene/allow for swallow between intakes/check mouth frequently for oral residue/pocketing/crush medication (when feasible)/no straws/position upright (90 degrees)/small sips/bites/alternate food with liquid

## 2019-02-12 ENCOUNTER — TRANSCRIPTION ENCOUNTER (OUTPATIENT)
Age: 69
End: 2019-02-12

## 2019-02-12 VITALS
DIASTOLIC BLOOD PRESSURE: 52 MMHG | TEMPERATURE: 100 F | SYSTOLIC BLOOD PRESSURE: 105 MMHG | HEART RATE: 63 BPM | RESPIRATION RATE: 17 BRPM | OXYGEN SATURATION: 94 %

## 2019-02-12 LAB
ANION GAP SERPL CALC-SCNC: 7 MMOL/L — SIGNIFICANT CHANGE UP (ref 5–17)
BUN SERPL-MCNC: 15 MG/DL — SIGNIFICANT CHANGE UP (ref 7–18)
CALCIUM SERPL-MCNC: 8 MG/DL — LOW (ref 8.4–10.5)
CHLORIDE SERPL-SCNC: 108 MMOL/L — SIGNIFICANT CHANGE UP (ref 96–108)
CO2 SERPL-SCNC: 24 MMOL/L — SIGNIFICANT CHANGE UP (ref 22–31)
CREAT SERPL-MCNC: 0.43 MG/DL — LOW (ref 0.5–1.3)
GLUCOSE SERPL-MCNC: 102 MG/DL — HIGH (ref 70–99)
POTASSIUM SERPL-MCNC: 3.8 MMOL/L — SIGNIFICANT CHANGE UP (ref 3.5–5.3)
POTASSIUM SERPL-SCNC: 3.8 MMOL/L — SIGNIFICANT CHANGE UP (ref 3.5–5.3)
SODIUM SERPL-SCNC: 139 MMOL/L — SIGNIFICANT CHANGE UP (ref 135–145)

## 2019-02-12 PROCEDURE — 84443 ASSAY THYROID STIM HORMONE: CPT

## 2019-02-12 PROCEDURE — 80053 COMPREHEN METABOLIC PANEL: CPT

## 2019-02-12 PROCEDURE — 86803 HEPATITIS C AB TEST: CPT

## 2019-02-12 PROCEDURE — 85730 THROMBOPLASTIN TIME PARTIAL: CPT

## 2019-02-12 PROCEDURE — 76000 FLUOROSCOPY <1 HR PHYS/QHP: CPT

## 2019-02-12 PROCEDURE — C1769: CPT

## 2019-02-12 PROCEDURE — 73562 X-RAY EXAM OF KNEE 3: CPT

## 2019-02-12 PROCEDURE — 86923 COMPATIBILITY TEST ELECTRIC: CPT

## 2019-02-12 PROCEDURE — 84100 ASSAY OF PHOSPHORUS: CPT

## 2019-02-12 PROCEDURE — 96375 TX/PRO/DX INJ NEW DRUG ADDON: CPT

## 2019-02-12 PROCEDURE — 82607 VITAMIN B-12: CPT

## 2019-02-12 PROCEDURE — 86900 BLOOD TYPING SEROLOGIC ABO: CPT

## 2019-02-12 PROCEDURE — 87086 URINE CULTURE/COLONY COUNT: CPT

## 2019-02-12 PROCEDURE — 86850 RBC ANTIBODY SCREEN: CPT

## 2019-02-12 PROCEDURE — 82746 ASSAY OF FOLIC ACID SERUM: CPT

## 2019-02-12 PROCEDURE — P9040: CPT

## 2019-02-12 PROCEDURE — 73521 X-RAY EXAM HIPS BI 2 VIEWS: CPT

## 2019-02-12 PROCEDURE — 83735 ASSAY OF MAGNESIUM: CPT

## 2019-02-12 PROCEDURE — 80048 BASIC METABOLIC PNL TOTAL CA: CPT

## 2019-02-12 PROCEDURE — 93005 ELECTROCARDIOGRAM TRACING: CPT

## 2019-02-12 PROCEDURE — 80061 LIPID PANEL: CPT

## 2019-02-12 PROCEDURE — 86901 BLOOD TYPING SEROLOGIC RH(D): CPT

## 2019-02-12 PROCEDURE — 92610 EVALUATE SWALLOWING FUNCTION: CPT

## 2019-02-12 PROCEDURE — 81001 URINALYSIS AUTO W/SCOPE: CPT

## 2019-02-12 PROCEDURE — 99285 EMERGENCY DEPT VISIT HI MDM: CPT | Mod: 25

## 2019-02-12 PROCEDURE — 97110 THERAPEUTIC EXERCISES: CPT

## 2019-02-12 PROCEDURE — 85610 PROTHROMBIN TIME: CPT

## 2019-02-12 PROCEDURE — 36415 COLL VENOUS BLD VENIPUNCTURE: CPT

## 2019-02-12 PROCEDURE — 85027 COMPLETE CBC AUTOMATED: CPT

## 2019-02-12 PROCEDURE — 83036 HEMOGLOBIN GLYCOSYLATED A1C: CPT

## 2019-02-12 PROCEDURE — 96374 THER/PROPH/DIAG INJ IV PUSH: CPT

## 2019-02-12 PROCEDURE — 97161 PT EVAL LOW COMPLEX 20 MIN: CPT

## 2019-02-12 PROCEDURE — C1713: CPT

## 2019-02-12 PROCEDURE — 80164 ASSAY DIPROPYLACETIC ACD TOT: CPT

## 2019-02-12 PROCEDURE — 36430 TRANSFUSION BLD/BLD COMPNT: CPT

## 2019-02-12 PROCEDURE — 71045 X-RAY EXAM CHEST 1 VIEW: CPT

## 2019-02-12 RX ORDER — ENOXAPARIN SODIUM 100 MG/ML
40 INJECTION SUBCUTANEOUS
Qty: 0 | Refills: 0 | DISCHARGE
Start: 2019-02-12

## 2019-02-12 RX ORDER — TRAMADOL HYDROCHLORIDE 50 MG/1
0.5 TABLET ORAL
Qty: 0 | Refills: 0 | DISCHARGE
Start: 2019-02-12

## 2019-02-12 RX ORDER — DOCUSATE SODIUM 100 MG
1 CAPSULE ORAL
Qty: 0 | Refills: 0 | DISCHARGE
Start: 2019-02-12

## 2019-02-12 RX ADMIN — PANTOPRAZOLE SODIUM 40 MILLIGRAM(S): 20 TABLET, DELAYED RELEASE ORAL at 06:25

## 2019-02-12 RX ADMIN — Medication 0.5 MILLIGRAM(S): at 05:16

## 2019-02-12 RX ADMIN — Medication 100 MILLIGRAM(S): at 05:16

## 2019-02-12 RX ADMIN — OLANZAPINE 15 MILLIGRAM(S): 15 TABLET, FILM COATED ORAL at 11:35

## 2019-02-12 RX ADMIN — ENOXAPARIN SODIUM 30 MILLIGRAM(S): 100 INJECTION SUBCUTANEOUS at 06:25

## 2019-02-12 RX ADMIN — Medication 50 MICROGRAM(S): at 05:16

## 2019-02-12 RX ADMIN — TRAMADOL HYDROCHLORIDE 25 MILLIGRAM(S): 50 TABLET ORAL at 05:16

## 2019-02-12 RX ADMIN — Medication 81 MILLIGRAM(S): at 11:36

## 2019-02-12 NOTE — DISCHARGE NOTE ADULT - HOSPITAL COURSE
69 y/o F from [facility], wheelchair bound with PMHx of anemia, bipolar disorder, COPD, GERD, HLD, hypothyroidism, osteopenia, pacemaker, chronic paranoia, and schizophrenia, Iron deificiency Anemia, depression, Dementia with behavioural problem, sent from [facility] s/p Fall in bathroom. Patient is AXO 1= poor historian, states she fell in bathroom, she has knee pain. Further hx is limited.    In ED, patient's vital signs were stable. Labs were remarkable for WBC:12.1, Hb:9.0. Xray of Knee shows" Osteoporosis. No fracture." CXR: Mild pulmonary vascular congestion. Cardiomegaly. Pacemaker. Xray of hip shows " Comminuted mildly displaced intertrochanteric fracture right femoral neck. Osteoporosis."  Patient was admitted and medically optimized, patient was taken to the OR on 2/10 and underwent R hip IM nailing. Patient tolerated procedure well. Patient for d/c to [facility]

## 2019-02-12 NOTE — DISCHARGE NOTE ADULT - SECONDARY DIAGNOSIS.
COPD (chronic obstructive pulmonary disease) GERD (gastroesophageal reflux disease) Bipolar Disorder

## 2019-02-12 NOTE — DISCHARGE NOTE ADULT - MEDICATION SUMMARY - MEDICATIONS TO TAKE
I will START or STAY ON the medications listed below when I get home from the hospital:    aspirin 81 mg oral tablet, chewable  -- 1 tab(s) by mouth once a day  -- Indication: For Cad     acetaminophen 325 mg oral tablet  -- Indication: For Prn pain     oxycodone-acetaminophen 5 mg-325 mg oral tablet  -- 1 tab(s) by mouth every 4 hours, As needed, Mild Pain (1 - 3)  -- Indication: For Prn pain     traMADol 50 mg oral tablet  -- 0.5 tab(s) by mouth every 6 hours, As needed, Moderate Pain (4 - 6)  -- Indication: For Prn pain     enoxaparin  -- 40 milligram(s) subcutaneous once a day  -- Indication: For DVT prophylaxis     valproic acid 250 mg/5 mL oral syrup  -- 30 milliliter(s) by mouth once a day  -- Indication: For Bipolar Disorder    clonazePAM 0.5 mg oral tablet  -- 1 tab(s) by mouth 2 times a day  -- Indication: For Bipolar Disorder    OLANZapine 15 mg oral tablet  -- 1 tab(s) by mouth once a day  -- Indication: For Bipolar Disorder    docusate sodium 100 mg oral capsule  -- 1 cap(s) by mouth 3 times a day  -- Indication: For COnstipation    omeprazole 40 mg oral delayed release capsule  -- 1 cap(s) by mouth once a day  -- Indication: For gerd     Synthroid 50 mcg (0.05 mg) oral tablet  -- 1 tab(s) by mouth once a day  -- Indication: For Hypothyroidism

## 2019-02-12 NOTE — DISCHARGE NOTE ADULT - PATIENT PORTAL LINK FT
You can access the [facility], offered by [facility], by registering with the following website: [email or web address]

## 2019-02-12 NOTE — DISCHARGE NOTE ADULT - MEDICATION SUMMARY - MEDICATIONS TO STOP TAKING
I will STOP taking the medications listed below when I get home from the hospital:    Tamiflu 75 mg oral capsule  -- 1 cap(s) by mouth 2 times a day  -- Check with your doctor before becoming pregnant.  Finish all this medication unless otherwise directed by prescriber.    Levaquin 500 mg oral tablet  -- 1 tab(s) by mouth once a day  -- Avoid prolonged or excessive exposure to direct and/or artificial sunlight while taking this medication.  Do not take dairy products, antacids, or iron preparations within one hour of this medication.  Finish all this medication unless otherwise directed by prescriber.  May cause drowsiness or dizziness.  Medication should be taken with plenty of water.

## 2019-02-12 NOTE — DISCHARGE NOTE ADULT - CARE PLAN
Principal Discharge DX:	Hip fracture  Goal:	Improve range of motion  Assessment and plan of treatment:	Please follow up with Dr. Iverson within 2 weeks   WBAT of right lower extremity  Continue DVT prophylaxis for 2 weeks   may d/c staples and place steri strips on 2/25  Secondary Diagnosis:	COPD (chronic obstructive pulmonary disease)  Goal:	Continue current care and follow up with PCP  Secondary Diagnosis:	GERD (gastroesophageal reflux disease)  Goal:	Continue current care and follow up with PCP  Secondary Diagnosis:	Bipolar Disorder  Goal:	Continue current care and follow up with PCP

## 2019-02-12 NOTE — DISCHARGE NOTE ADULT - PLAN OF CARE
Continue current care and follow up with PCP Improve range of motion Please follow up with Dr. Iverson within 2 weeks   WBAT of right lower extremity  Continue DVT prophylaxis for 2 weeks   may d/c staples and place steri strips on 2/25

## 2019-02-12 NOTE — DISCHARGE NOTE ADULT - CARE PROVIDER_API CALL
Serafin Iverson (MD)  Orthopaedic Surgery; Sports Medicine  [address]  [place] [id number]  Phone: [phone]  Fax: [phone]  Follow Up Time: 2 weeks

## 2019-02-12 NOTE — PROGRESS NOTE ADULT - SUBJECTIVE AND OBJECTIVE BOX
68yFemale    Diagnosis:  S/p R Hip IM Nailing POD#2    Patient was seen and evaluated at bedside. Patient with no acute complaints.   Pain is well controlled.   Denies CP/SOB, dyspnea, paresthesias, N/V/D, palpitations.     Vital Signs Last 24 Hrs  T(C): 36.9 (12 Feb 2019 05:31), Max: 37.7 (11 Feb 2019 22:30)  T(F): 98.5 (12 Feb 2019 05:31), Max: 99.8 (11 Feb 2019 22:30)  HR: 84 (12 Feb 2019 05:31) (84 - 101)  BP: 95/47 (12 Feb 2019 05:31) (95/47 - 127/43)  BP(mean): --  RR: 18 (12 Feb 2019 05:31) (18 - 18)  SpO2: 96% (12 Feb 2019 05:31) (94% - 99%)  I&O's Detail    11 Feb 2019 07:01  -  12 Feb 2019 07:00  --------------------------------------------------------  IN:  Total IN: 0 mL    OUT:    Indwelling Catheter - Urethral: 200 mL  Total OUT: 200 mL    Total NET: -200 mL          Physical Exam:    General: AAOx3, NAD, resting comfortably in bed.    R Hip:  Dressing is C/D/I. Skin is pink and warm. Staples intact. No erythema. SILT.  Wound with no drainage, healing well.   Lower extremity:  No calf tenderness, calves are soft. 2+pulses. NVI. 5/5 Strength of EHL/TA/gastrocnemius B/L.  Good capillary refill. Warm, well-perfused.                           10.0   10.6  )-----------( 407      ( 11 Feb 2019 07:26 )             30.5     02-12    139  |  108  |  15  ----------------------------<  102<H>  3.8   |  24  |  0.43<L>    Ca    8.0<L>      12 Feb 2019 07:47        Impression:  68yFemale S/p R Hip IM Nailing POD#2  Plan:  -  Pain management  -  Dvt prophylaxis with Lovenox  -  Daily Physical Theapy:  WBAT of right lower extremity  -  Discharge planning: Home Vs. Rehab pending Physical therapy eval.  -  Dressing changed

## 2019-12-07 ENCOUNTER — INPATIENT (INPATIENT)
Facility: HOSPITAL | Age: 69
LOS: 3 days | Discharge: NURSING, REHAB OR LONG-TERM CARE | DRG: 313 | End: 2019-12-11
Attending: INTERNAL MEDICINE | Admitting: INTERNAL MEDICINE
Payer: MEDICARE

## 2019-12-07 VITALS
WEIGHT: 169.98 LBS | DIASTOLIC BLOOD PRESSURE: 65 MMHG | SYSTOLIC BLOOD PRESSURE: 101 MMHG | TEMPERATURE: 98 F | RESPIRATION RATE: 17 BRPM | OXYGEN SATURATION: 97 % | HEIGHT: 65 IN

## 2019-12-07 DIAGNOSIS — E03.9 HYPOTHYROIDISM, UNSPECIFIED: ICD-10-CM

## 2019-12-07 DIAGNOSIS — F25.9 SCHIZOAFFECTIVE DISORDER, UNSPECIFIED: ICD-10-CM

## 2019-12-07 DIAGNOSIS — K21.9 GASTRO-ESOPHAGEAL REFLUX DISEASE WITHOUT ESOPHAGITIS: ICD-10-CM

## 2019-12-07 DIAGNOSIS — J44.9 CHRONIC OBSTRUCTIVE PULMONARY DISEASE, UNSPECIFIED: ICD-10-CM

## 2019-12-07 DIAGNOSIS — Z71.89 OTHER SPECIFIED COUNSELING: ICD-10-CM

## 2019-12-07 DIAGNOSIS — I24.9 ACUTE ISCHEMIC HEART DISEASE, UNSPECIFIED: ICD-10-CM

## 2019-12-07 DIAGNOSIS — Z29.9 ENCOUNTER FOR PROPHYLACTIC MEASURES, UNSPECIFIED: ICD-10-CM

## 2019-12-07 LAB
ACETONE SERPL-MCNC: NEGATIVE — SIGNIFICANT CHANGE UP
ALBUMIN SERPL ELPH-MCNC: 3.2 G/DL — LOW (ref 3.5–5)
ALP SERPL-CCNC: 99 U/L — SIGNIFICANT CHANGE UP (ref 40–120)
ALT FLD-CCNC: 14 U/L DA — SIGNIFICANT CHANGE UP (ref 10–60)
ANION GAP SERPL CALC-SCNC: 3 MMOL/L — LOW (ref 5–17)
APTT BLD: 43.1 SEC — HIGH (ref 27.5–36.3)
AST SERPL-CCNC: 12 U/L — SIGNIFICANT CHANGE UP (ref 10–40)
BILIRUB SERPL-MCNC: 0.3 MG/DL — SIGNIFICANT CHANGE UP (ref 0.2–1.2)
BUN SERPL-MCNC: 12 MG/DL — SIGNIFICANT CHANGE UP (ref 7–18)
CALCIUM SERPL-MCNC: 8.8 MG/DL — SIGNIFICANT CHANGE UP (ref 8.4–10.5)
CHLORIDE SERPL-SCNC: 108 MMOL/L — SIGNIFICANT CHANGE UP (ref 96–108)
CO2 SERPL-SCNC: 31 MMOL/L — SIGNIFICANT CHANGE UP (ref 22–31)
CREAT SERPL-MCNC: 0.63 MG/DL — SIGNIFICANT CHANGE UP (ref 0.5–1.3)
GLUCOSE SERPL-MCNC: 90 MG/DL — SIGNIFICANT CHANGE UP (ref 70–99)
HCT VFR BLD CALC: 36.7 % — SIGNIFICANT CHANGE UP (ref 34.5–45)
HGB BLD-MCNC: 11.7 G/DL — SIGNIFICANT CHANGE UP (ref 11.5–15.5)
INR BLD: 1.1 RATIO — SIGNIFICANT CHANGE UP (ref 0.88–1.16)
LIDOCAIN IGE QN: 124 U/L — SIGNIFICANT CHANGE UP (ref 73–393)
MAGNESIUM SERPL-MCNC: 2.7 MG/DL — HIGH (ref 1.6–2.6)
MCHC RBC-ENTMCNC: 30.3 PG — SIGNIFICANT CHANGE UP (ref 27–34)
MCHC RBC-ENTMCNC: 31.9 GM/DL — LOW (ref 32–36)
MCV RBC AUTO: 95.1 FL — SIGNIFICANT CHANGE UP (ref 80–100)
NRBC # BLD: 0 /100 WBCS — SIGNIFICANT CHANGE UP (ref 0–0)
NT-PROBNP SERPL-SCNC: 278 PG/ML — HIGH (ref 0–125)
PLATELET # BLD AUTO: 275 K/UL — SIGNIFICANT CHANGE UP (ref 150–400)
POTASSIUM SERPL-MCNC: 4.7 MMOL/L — SIGNIFICANT CHANGE UP (ref 3.5–5.3)
POTASSIUM SERPL-SCNC: 4.7 MMOL/L — SIGNIFICANT CHANGE UP (ref 3.5–5.3)
PROT SERPL-MCNC: 7.1 G/DL — SIGNIFICANT CHANGE UP (ref 6–8.3)
PROTHROM AB SERPL-ACNC: 12.3 SEC — SIGNIFICANT CHANGE UP (ref 10–12.9)
RBC # BLD: 3.86 M/UL — SIGNIFICANT CHANGE UP (ref 3.8–5.2)
RBC # FLD: 13.9 % — SIGNIFICANT CHANGE UP (ref 10.3–14.5)
SODIUM SERPL-SCNC: 142 MMOL/L — SIGNIFICANT CHANGE UP (ref 135–145)
TROPONIN I SERPL-MCNC: <0.015 NG/ML — SIGNIFICANT CHANGE UP (ref 0–0.04)
TROPONIN I SERPL-MCNC: <0.015 NG/ML — SIGNIFICANT CHANGE UP (ref 0–0.04)
WBC # BLD: 6.31 K/UL — SIGNIFICANT CHANGE UP (ref 3.8–10.5)
WBC # FLD AUTO: 6.31 K/UL — SIGNIFICANT CHANGE UP (ref 3.8–10.5)

## 2019-12-07 PROCEDURE — 99285 EMERGENCY DEPT VISIT HI MDM: CPT

## 2019-12-07 PROCEDURE — 71045 X-RAY EXAM CHEST 1 VIEW: CPT | Mod: 26

## 2019-12-07 RX ORDER — LEVOTHYROXINE SODIUM 125 MCG
50 TABLET ORAL DAILY
Refills: 0 | Status: DISCONTINUED | OUTPATIENT
Start: 2019-12-07 | End: 2019-12-11

## 2019-12-07 RX ORDER — INFLUENZA VIRUS VACCINE 15; 15; 15; 15 UG/.5ML; UG/.5ML; UG/.5ML; UG/.5ML
0.5 SUSPENSION INTRAMUSCULAR ONCE
Refills: 0 | Status: COMPLETED | OUTPATIENT
Start: 2019-12-07 | End: 2019-12-07

## 2019-12-07 RX ORDER — ASPIRIN/CALCIUM CARB/MAGNESIUM 324 MG
162 TABLET ORAL ONCE
Refills: 0 | Status: COMPLETED | OUTPATIENT
Start: 2019-12-07 | End: 2019-12-07

## 2019-12-07 RX ORDER — ASPIRIN/CALCIUM CARB/MAGNESIUM 324 MG
81 TABLET ORAL DAILY
Refills: 0 | Status: DISCONTINUED | OUTPATIENT
Start: 2019-12-07 | End: 2019-12-11

## 2019-12-07 RX ORDER — VALPROIC ACID (AS SODIUM SALT) 250 MG/5ML
250 SOLUTION, ORAL ORAL DAILY
Refills: 0 | Status: DISCONTINUED | OUTPATIENT
Start: 2019-12-07 | End: 2019-12-11

## 2019-12-07 RX ORDER — ENOXAPARIN SODIUM 100 MG/ML
40 INJECTION SUBCUTANEOUS DAILY
Refills: 0 | Status: DISCONTINUED | OUTPATIENT
Start: 2019-12-07 | End: 2019-12-11

## 2019-12-07 RX ORDER — CLONAZEPAM 1 MG
0.5 TABLET ORAL
Refills: 0 | Status: DISCONTINUED | OUTPATIENT
Start: 2019-12-07 | End: 2019-12-11

## 2019-12-07 RX ORDER — PANTOPRAZOLE SODIUM 20 MG/1
40 TABLET, DELAYED RELEASE ORAL
Refills: 0 | Status: DISCONTINUED | OUTPATIENT
Start: 2019-12-07 | End: 2019-12-11

## 2019-12-07 RX ORDER — OLANZAPINE 15 MG/1
15 TABLET, FILM COATED ORAL AT BEDTIME
Refills: 0 | Status: DISCONTINUED | OUTPATIENT
Start: 2019-12-07 | End: 2019-12-11

## 2019-12-07 RX ORDER — ACETAMINOPHEN 500 MG
650 TABLET ORAL DAILY
Refills: 0 | Status: DISCONTINUED | OUTPATIENT
Start: 2019-12-07 | End: 2019-12-09

## 2019-12-07 RX ORDER — ACETAMINOPHEN 500 MG
1000 TABLET ORAL ONCE
Refills: 0 | Status: COMPLETED | OUTPATIENT
Start: 2019-12-07 | End: 2019-12-07

## 2019-12-07 RX ORDER — ACETAMINOPHEN 500 MG
650 TABLET ORAL EVERY 6 HOURS
Refills: 0 | Status: DISCONTINUED | OUTPATIENT
Start: 2019-12-07 | End: 2019-12-11

## 2019-12-07 RX ADMIN — Medication 162 MILLIGRAM(S): at 14:48

## 2019-12-07 RX ADMIN — Medication 1000 MILLIGRAM(S): at 18:04

## 2019-12-07 RX ADMIN — Medication 1 APPLICATION(S): at 23:38

## 2019-12-07 RX ADMIN — Medication 400 MILLIGRAM(S): at 14:48

## 2019-12-07 RX ADMIN — Medication 0.5 MILLIGRAM(S): at 18:40

## 2019-12-07 RX ADMIN — OLANZAPINE 15 MILLIGRAM(S): 15 TABLET, FILM COATED ORAL at 23:41

## 2019-12-07 NOTE — H&P ADULT - PROBLEM SELECTOR PLAN 7
IMPROVE VTE Individual Risk Assessment    RISK                                                          Points  [] Previous VTE                                           3  [] Thrombophilia                                        2  [] Lower limb paralysis                              2   [] Current Cancer                                       2   [X] Immobilization > 24 hrs                        1  [] ICU/CCU stay > 24 hours                       1  [X] Age > 60                                                   1    IMPROVE VTE Score:  On subq Heparin 5000U

## 2019-12-07 NOTE — ED PROVIDER NOTE - PRO INTERPRETER NEED 2
Contacted patient's family in [address] waiting area to provide patient status update and procedure start. English

## 2019-12-07 NOTE — ED PROVIDER NOTE - CADM POA PRESS ULCER
"              After Visit Summary   2018    Marsha Meléndez    MRN: [id number]           Patient Information     Date Of Birth          2016        Visit Information        Provider Department      2018 10:00 AM Amber Hahn MD [facility]        Today's Diagnoses     WCC (well child check),  8-28 days old    -  1      Care Instructions        Preventive Care at the  Visit    Growth Measurements & Percentiles  Head Circumference: 14.21\" (36.1 cm) (82 %, Source: WHO (Girls, 0-2 years)) 82 %ile based on WHO (Girls, 0-2 years) head circumference-for-age data using vitals from 2018.   Birth Weight: 7 lbs .17 oz   Weight: 7 lbs 8 oz / 3.4 kg (actual weight) / 31 %ile based on WHO (Girls, 0-2 years) weight-for-age data using vitals from 2018.   Length: 1' 7.961\" / 50.7 cm 42 %ile based on WHO (Girls, 0-2 years) length-for-age data using vitals from 2018.   Weight for length: 38 %ile based on WHO (Girls, 0-2 years) weight-for-recumbent length data using vitals from 2018.    Recommended preventive visits for your :  2 weeks old  2 months old    Here s what your baby might be doing from birth to 2 months of age.    Growth and development    Begins to smile at familiar faces and voices, especially parents  voices.    Movements become less jerky.    Lifts chin for a few seconds when lying on the tummy.    Cannot hold head upright without support.    Holds onto an object that is placed in her hand.    Has a different cry for different needs, such as hunger or a wet diaper.    Has a fussy time, often in the evening.  This starts at about 2 to 3 weeks of age.    Makes noises and cooing sounds.    Usually gains 4 to 5 ounces per week.      Vision and hearing    Can see about one foot away at birth.  By 2 months, she can see about 10 feet away.    Starts to follow some moving objects with eyes.  Uses eyes to explore the world.    Makes eye " "contact.    Can see colors.    Hearing is fully developed.  She will be startled by loud sounds.    Things you can do to help your child  1. Talk and sing to your baby often.  2. Let your baby look at faces and bright colors.    All babies are different    The information here shows average development.  All babies develop at their own rate.  Certain behaviors and physical milestones tend to occur at certain ages, but there is a wide range of growth and behavior that is normal.  Your baby might reach some milestones earlier or later than the average child.  If you have any concerns about your baby s development, talk with your doctor or nurse.      Feeding  The only food your baby needs right now is breast milk or iron-fortified formula.  Your baby does not need water at this age.  Ask your doctor about giving your baby a Vitamin D supplement.    Breastfeeding tips    Breastfeed every 2-4 hours. If your baby is sleepy - use breast compression, push on chin to \"start up\" baby, switch breasts, undress to diaper and wake before relatching.     Some babies \"cluster\" feed every 1 hour for a while- this is normal. Feed your baby whenever he/she is awake-  even if every hour for a while. This frequent feeding will help you make more milk and encourage your baby to sleep for longer stretches later in the evening or night.      Position your baby close to you with pillows so he/she is facing you -belly to belly laying horizontally across your lap at the level of your breast and looking a bit \"upwards\" to your breast     One hand holds the baby's neck behind the ears and the other hand holds your breast    Baby's nose should start out pointing to your nipple before latching    Hold your breast in a \"sandwich\" position by gently squeezing your breast in an oval shape and make sure your hands are not covering the areola    This \"nipple sandwich\" will make it easier for your breast to fit inside the baby's mouth-making latching " "more comfortable for you and baby and preventing sore nipples. Your baby should take a \"mouthful\" of breast!    You may want to use hand expression to \"prime the pump\" and get a drip of milk out on your nipple to wake baby     (see website: newborns.[place].edu/Breastfeeding/HandExpression.html)    Swipe your nipple on baby's upper lip and wait for a BIG open mouth    YOU bring baby to the breast (hold baby's neck with your fingers just below the ears) and bring baby's head to the breast--leading with the chin.  Try to avoid pushing your breast into baby's mouth- bring baby to you instead!    Aim to get your baby's bottom lip LOW DOWN ON AREOLA (baby's upper lip just needs to \"clear\" the nipple).     Your baby should latch onto the areola and NOT just the nipple. That way your baby gets more milk and you don't get sore nipples!     Websites about breastfeeding  www.womenshealth.gov/breastfeeding - many topics and videos   www.[employer] Tuningline.Context Relevant  - general information and videos about latching  http://newborns.[place].edu/Breastfeeding/HandExpression.html - video about hand expression   http://newborns.[place].edu/Breastfeeding/ABCs.html#ABCs  - general information  www.Hipcricket.org - [facility] - information about breastfeeding and support groups    Formula  General guidelines    Age   # time/day   Serving Size     0-1 Month   6-8 times   2-4 oz     1-2 Months   5-7 times   3-5 oz     2-3 Months   4-6 times   4-7 oz     3-4 Months    4-6 times   5-8 oz       If bottle feeding your baby, hold the bottle.  Do not prop it up.    During the daytime, do not let your baby sleep more than four hours between feedings.  At night, it is normal for young babies to wake up to eat about every two to four hours.    Hold, cuddle and talk to your baby during feedings.    Do not give any other foods to your baby.  Your baby s body is not ready to handle them.    Babies like to suck.  For bottle-fed babies, try a " pacifier if your baby needs to suck when not feeding.  If your baby is breastfeeding, try having her suck on your finger for comfort--wait two to three weeks (or until breast feeding is well established) before giving a pacifier, so the baby learns to latch well first.    Never put formula or breast milk in the microwave.    To warm a bottle of formula or breast milk, place it in a bowl of warm water for a few minutes.  Before feeding your baby, make sure the breast milk or formula is not too hot.  Test it first by squirting it on the inside of your wrist.    Concentrated liquid or powdered formulas need to be mixed with water.  Follow the directions on the can.      Sleeping    Most babies will sleep about 16 hours a day or more.    You can do the following to reduce the risk of SIDS (sudden infant death syndrome):    Place your baby on her back.  Do not place your baby on her stomach or side.    Do not put pillows, loose blankets or stuffed animals under or near your baby.    If you think you baby is cold, put a second sleep sack on your child.    Never smoke around your baby.      If your baby sleeps in a crib or bassinet:    If you choose to have your baby sleep in a crib or bassinet, you should:      Use a firm, flat mattress.    Make sure the railings on the crib are no more than 2 3/8 inches apart.  Some older cribs are not safe because the railings are too far apart and could allow your baby s head to become trapped.    Remove any soft pillows or objects that could suffocate your baby.    Check that the mattress fits tightly against the sides of the bassinet or the railings of the crib so your baby s head cannot be trapped between the mattress and the sides.    Remove any decorative trimmings on the crib in which your baby s clothing could be caught.    Remove hanging toys, mobiles, and rattles when your baby can begin to sit up (around 5 or 6 months)    Lower the level of the mattress and remove bumper pads  when your baby can pull himself to a standing position, so he will not be able to climb out of the crib.    Avoid loose bedding.      Elimination    Your baby:    May strain to pass stools (bowel movements).  This is normal as long as the stools are soft, and she does not cry while passing them.    Has frequent, soft stools, which will be runny or pasty, yellow or green and  seedy.   This is normal.    Usually wets at least six diapers a day.      Safety      Always use an approved car seat.  This must be in the back seat of the car, facing backward.  For more information, check out www.seatcheck.org.    Never leave your baby alone with small children or pets.    Pick a safe place for your baby s crib.  Do not use an older drop-side crib.    Do not drink anything hot while holding your baby.    Don t smoke around your baby.    Never leave your baby alone in water.  Not even for a second.    Do not use sunscreen on your baby s skin.  Protect your baby from the sun with hats and canopies, or keep your baby in the shade.    Have a carbon monoxide detector near the furnace area.    Use properly working smoke detectors in your house.  Test your smoke detectors when daylight savings time begins and ends.      When to call the doctor    Call your baby s doctor or nurse if your baby:      Has a rectal temperature of 100.4 F (38 C) or higher.    Is very fussy for two hours or more and cannot be calmed or comforted.    Is very sleepy and hard to awaken.      What you can expect      You will likely be tired and busy    Spend time together with family and take time to relax.    If you are returning to work, you should think about .    You may feel overwhelmed, scared or exhausted.  Ask family or friends for help.  If you  feel blue  for more than 2 weeks, call your doctor.  You may have depression.    Being a parent is the biggest job you will ever have.  Support and information are important.  Reach out for help  when you feel the need.      For more information on recommended immunizations:    www.cdc.gov/nip    For general medical information and more  Immunization facts go to:  www.aap.org  www.aafp.org  www.fairview.org  www.cdc.gov/hepatitis  www.immunize.org  www.immunize.org/express  www.immunize.org/stories  www.vaccines.org    For early childhood family education programs in your school district, go to: wwwArrogene.[facility].VouchAR/~ileana    For help with food, housing, clothing, medicines and other essentials, call:  [employer]  at [phone]      How often should my child/teen be seen for well check-ups?       (5-8 days)    2 weeks    2 months    4 months    6 months    9 months    12 months    15 months    18 months    24 months    30 months    3 years and every year through 18 years of age          Follow-ups after your visit        Who to contact     If you have questions or need follow up information about today's clinic visit or your schedule please contact [facility] directly at [phone].  Normal or non-critical lab and imaging results will be communicated to you by VoipSwitchhart, letter or phone within 4 business days after the clinic has received the results. If you do not hear from us within 7 days, please contact the clinic through [employer] or phone. If you have a critical or abnormal lab result, we will notify you by phone as soon as possible.  Submit refill requests through JouleX or call your pharmacy and they will forward the refill request to us. Please allow 3 business days for your refill to be completed.          Additional Information About Your Visit        VoipSwitchharDailybreak Media Information     JouleX lets you send messages to your doctor, view your test results, renew your prescriptions, schedule appointments and more. To sign up, go to [email or web address], contact your [facility] clinic or call [phone] during business hours.            Care EveryWhere ID     This is your  "Care EveryWhere ID. This could be used by other organizations to access your [facility] medical records  [id number]        Your Vitals Were     Pulse Temperature Height Head Circumference BMI (Body Mass Index)       148 99.2  F (37.3  C) (Rectal) 1' 7.96\" (0.507 m) 14.21\" (36.1 cm) 13.23 kg/m2        Blood Pressure from Last 3 Encounters:   No data found for BP    Weight from Last 3 Encounters:   18 7 lb 8 oz (3.402 kg) (31 %)*   18 6 lb 9.5 oz (2.991 kg) (21 %)*   18 6 lb 11.1 oz (3.035 kg) (31 %)*     * Growth percentiles are based on WHO (Girls, 0-2 years) data.              Today, you had the following     No orders found for display         Today's Medication Changes          These changes are accurate as of 18 10:24 AM.  If you have any questions, ask your nurse or doctor.               Start taking these medicines.        Dose/Directions    cholecalciferol D3 400 UNT/0.03ML Liqd   Used for:  WCC (well child check),  8-28 days old   Started by:  Amber Hahn MD        Dose:  0.03 mL   Take 0.03 mLs by mouth daily   Quantity:  1 Bottle   Refills:  11            Where to get your medicines      These medications were sent to [facility] [address] [place] [address] AT [address] [address]    Hours:  24-hours Phone:  [phone]     cholecalciferol D3 400 UNT/0.03ML Liqd                Primary Care Provider Office Phone # Fax #    [facility] [phone]       [address] [place] [id number]        Equal Access to Services     BRIGIDA HOWE AH: Federico Grant, wendie steward, jazmyn wynn. [facility] [phone].    ATENCIÓN: Si habla español, tiene a mccoy disposición servicios gratuitos de asistencia lingüística. Llame al [phone].    We comply with applicable federal civil rights " laws and [place] laws. We do not discriminate on the basis of race, color, national origin, age, disability, sex, sexual orientation, or gender identity.            Thank you!     Thank you for choosing [facility]  for your care. Our goal is always to provide you with excellent care. Hearing back from our patients is one way we can continue to improve our services. Please take a few minutes to complete the written survey that you may receive in the mail after your visit with us. Thank you!             Your Updated Medication List - Protect others around you: Learn how to safely use, store and throw away your medicines at www.disposemymeds.org.          This list is accurate as of 18 10:24 AM.  Always use your most recent med list.                   Brand Name Dispense Instructions for use Diagnosis    cholecalciferol D3 400 UNT/0.03ML Liqd     1 Bottle    Take 0.03 mLs by mouth daily    WCC (well child check),  8-28 days old          No

## 2019-12-07 NOTE — ED ADULT TRIAGE NOTE - HEIGHT IN FEET
Bed: IN01  Expected date:   Expected time:   Means of arrival: Car  Comments:  Camacho Bhagat MRN [id number]  Recent liver trans in 2/17, recent take down of iliostomy, mass in R middle quadrant pain was 10/10, 7/10 is tolerable   CT abd showed air with fluid and air in retroperitoneal concerned with infection, significant abd swelling and ascite present, trace R pleural effusion, basal atelectasis,     130/70, 97 % RA, HR 80's, afebrile.   4.5g zosyn given, 1 Narco given for p  ain. 18 in L AC   5

## 2019-12-07 NOTE — H&P ADULT - NSICDXPASTMEDICALHX_GEN_ALL_CORE_FT
PAST MEDICAL HISTORY:  Anemia     Bipolar Disorder     COPD (chronic obstructive pulmonary disease)     GERD (gastroesophageal reflux disease)     Hypercholesteremia     Hypothyroidism     Osteopenia     Pacemaker     Paranoia CHRONIC    Schizophrenia

## 2019-12-07 NOTE — H&P ADULT - ASSESSMENT
69 F coming from [place] assisted living, wheel chair bound, with PMH of Schizoaffective disorder, MDD, AUREA, S/P Pacemaker placement ( date unknown to pt) came to the ED with complaints of pain at the Pacemaker implantation site since last night. She describes the pain to be a sharp 10/10, radiating towards L arm that woke her up from sleep last night. She tried taking Tylenol that did not help her with the pain. Pt denies any SOB, palpitations, chest pain, nausea/vomiting, diaphoresis or dizziness.  In the ED,     Pt appears comfortable, AAO X3  EKG- LBBB, Atrial paced rhythm with prolonged AV conducftion with SVT  Vitals-101/65, HR-81  CXR- mild cardiomegaly with pacemaker

## 2019-12-07 NOTE — ED PROVIDER NOTE - OBJECTIVE STATEMENT
69 y.o. AL female BIBA c/o pain over her pacemaker, constant, sharp, 10/10, radiating to Lt arm, palpitations, no sob, diaphoresis, dizziness, n/v, coughing, Given 2 tabs Tylenol with relief.  Pt is wheelchair bound.  Given 2 BASA by EMS

## 2019-12-07 NOTE — H&P ADULT - NSHPSOCIALHISTORY_GEN_ALL_CORE
Pt stays at the [facility] living.  Feels safe and treated well but wishes to be with her brother.   Pt denies any smoking, alcohol intake or substance abuse.

## 2019-12-07 NOTE — H&P ADULT - HISTORY OF PRESENT ILLNESS
69 F coming from [place] assisted living, wheel chair bound, with PMH of Schizoaffective disorder, MDD, AUREA, S/P Pacemaker placement ( date unknown to pt) came to the ED with complaints of pain at the Pacemaker implantation site since last night. She describes the pain to be a sharp 10/10, radiating towards L arm that woke her up from sleep last night. She tried taking Tylenol that did not help her with the pain. Pt denies any SOB, palpitations, chest pain, nausea/vomiting, diaphoresis or dizziness  PSH- Pt had b/l cataract surgery, Metallic plate placement in L hand.   SH- Denies smoking, alcoholk   FH- no significant family history     In the ED,   Pt appears comfortable, AAO X3  EKG- LBBB, Atrial paced rhythm with prolonged AV conducftion with SVT  Vitals-101/65, HR-81  CXR- mild cardiomegaly with pacemaker

## 2019-12-07 NOTE — H&P ADULT - NSHPPOADEEPVENOUSTHROMB_GEN_A_CORE
PHYSICAL THERAPY - DAILY TREATMENT NOTE    Patient Name: Alonzo White        Date: 2017  : 1927   YES Patient  Verified  Visit #:   15   of   15  Insurance: Payor: Effie Dodd / Plan: [place] MED[place]ARE PART [place] & B / Product Type: Medicare /      In time: 205 Out time: 230   Total Treatment Time: 25     Medicare Time Tracking (below)   Total Timed Codes (min):  25 1:1 Treatment Time:  25     TREATMENT AREA = Right temporomandibular joint disorder, unspecified [M26.601]    SUBJECTIVE  Pain Level (on 0 to 10 scale):  0  / 10   Medication Changes/New allergies or changes in medical history, any new surgeries or procedures? NO    If yes, update Summary List   Subjective Functional Status/Changes:  []  No changes reported     Reports no increase in s/s since last sessoin, compliant with use of Thera-bite device for assisting with mandibular depression          OBJECTIVE    25 min Manual Therapy: DTM to masseter, medial pterygoid, (R) MD distraction. Anterior translation   Rationale:      increase ROM and increase tissue extensibility to improve patient's ability to increase ability to chew a variety of foods     min Patient Education:  YES  Reviewed HEP   []  Progressed/Changed HEP based on: Other Objective/Functional Measures:    Demonstrates 30mm mandibular depression with decrease in hypertonicity of (R) mastication mm at conclusion of treatment   Post Treatment Pain Level (on 0 to 10) scale:   0  / 10     ASSESSMENT  Assessment/Changes in Function:     Progressing with overall function and mobility, demonstrates increase in mandibular depression, ability to return to non soft food diet without increase in (R) TMD pain     []  See Progress Note/Recertification   Patient will continue to benefit from skilled PT services to  to attain remaining goals. Progress toward goals / Updated goals:     Will DC to HEP at this time due to completion of program      PLAN  []  Upgrade activities as tolerated YES Continue plan of care   []  Discharge due to :    []  Other:      Therapist: Ritu Dias DPT, CIMT    Date: 4/20/2017 Time: 2:34 PM       No future appointments. no

## 2019-12-07 NOTE — H&P ADULT - PROBLEM SELECTOR PLAN 1
Pt c/o pain at the site of pacemaker since last night on the L side of chest. Denies palpiatations, diaphoresis, SOB  work up to r/o ACS  EKG- LBBB with Pre mature SVT complex. Afib with junctional pacemaker  on Telemetry   f/u echo   Tropsx 1 - negative. f/u trops 2 & 3.   f/u Pacemaker interrogation    Cardiology consult - Dr. Ponce.   pain management- Tyelenol  f/u PT evaluation

## 2019-12-07 NOTE — H&P ADULT - NSHPLABSRESULTS_GEN_ALL_CORE
11.7   6.31  )-----------( 275      ( 07 Dec 2019 14:05 )             36.7       12-07    142  |  108  |  12  ----------------------------<  90  4.7   |  31  |  0.63    Ca    8.8      07 Dec 2019 14:05  Mg     2.7     12-07    TPro  7.1  /  Alb  3.2<L>  /  TBili  0.3  /  DBili  x   /  AST  12  /  ALT  14  /  AlkPhos  99  12-07                  PT/INR - ( 07 Dec 2019 14:05 )   PT: 12.3 sec;   INR: 1.10 ratio         PTT - ( 07 Dec 2019 14:05 )  PTT:43.1 sec    Lactate Trend      CARDIAC MARKERS ( 07 Dec 2019 14:05 )  <0.015 ng/mL / x     / x     / x     / x            CAPILLARY BLOOD GLUCOSE

## 2019-12-07 NOTE — H&P ADULT - PROBLEM SELECTOR PLAN 6
GOC discussion was done with patient bedside. Pt has good understanding and comprehension   Pt kenyon to be a FULL CODE

## 2019-12-07 NOTE — H&P ADULT - NSHPPHYSICALEXAM_GEN_ALL_CORE
ICU Vital Signs Last 24 Hrs  T(C): 36.8 (07 Dec 2019 12:19), Max: 36.8 (07 Dec 2019 12:19)  T(F): 98.2 (07 Dec 2019 12:19), Max: 98.2 (07 Dec 2019 12:19)  HR: --  BP: 101/65 (07 Dec 2019 12:19) (101/65 - 101/65)  BP(mean): --  ABP: --  ABP(mean): --  RR: 17 (07 Dec 2019 12:19) (17 - 17)  SpO2: 97% (07 Dec 2019 12:19) (97% - 97%)

## 2019-12-08 LAB
ALBUMIN SERPL ELPH-MCNC: 3.2 G/DL — LOW (ref 3.5–5)
ALP SERPL-CCNC: 102 U/L — SIGNIFICANT CHANGE UP (ref 40–120)
ALT FLD-CCNC: 15 U/L DA — SIGNIFICANT CHANGE UP (ref 10–60)
ANION GAP SERPL CALC-SCNC: 7 MMOL/L — SIGNIFICANT CHANGE UP (ref 5–17)
APPEARANCE UR: ABNORMAL
AST SERPL-CCNC: 12 U/L — SIGNIFICANT CHANGE UP (ref 10–40)
BACTERIA # UR AUTO: ABNORMAL /HPF
BASOPHILS # BLD AUTO: 0.04 K/UL — SIGNIFICANT CHANGE UP (ref 0–0.2)
BASOPHILS NFR BLD AUTO: 0.7 % — SIGNIFICANT CHANGE UP (ref 0–2)
BILIRUB SERPL-MCNC: 0.5 MG/DL — SIGNIFICANT CHANGE UP (ref 0.2–1.2)
BILIRUB UR-MCNC: NEGATIVE — SIGNIFICANT CHANGE UP
BUN SERPL-MCNC: 11 MG/DL — SIGNIFICANT CHANGE UP (ref 7–18)
CALCIUM SERPL-MCNC: 9.3 MG/DL — SIGNIFICANT CHANGE UP (ref 8.4–10.5)
CHLORIDE SERPL-SCNC: 109 MMOL/L — HIGH (ref 96–108)
CHOLEST SERPL-MCNC: 167 MG/DL — SIGNIFICANT CHANGE UP (ref 10–199)
CO2 SERPL-SCNC: 27 MMOL/L — SIGNIFICANT CHANGE UP (ref 22–31)
COLOR SPEC: YELLOW — SIGNIFICANT CHANGE UP
CREAT SERPL-MCNC: 0.55 MG/DL — SIGNIFICANT CHANGE UP (ref 0.5–1.3)
DIFF PNL FLD: ABNORMAL
EOSINOPHIL # BLD AUTO: 0.14 K/UL — SIGNIFICANT CHANGE UP (ref 0–0.5)
EOSINOPHIL NFR BLD AUTO: 2.4 % — SIGNIFICANT CHANGE UP (ref 0–6)
EPI CELLS # UR: SIGNIFICANT CHANGE UP /HPF
FOLATE SERPL-MCNC: 9.7 NG/ML — SIGNIFICANT CHANGE UP
GLUCOSE SERPL-MCNC: 75 MG/DL — SIGNIFICANT CHANGE UP (ref 70–99)
GLUCOSE UR QL: NEGATIVE — SIGNIFICANT CHANGE UP
HBA1C BLD-MCNC: 5 % — SIGNIFICANT CHANGE UP (ref 4–5.6)
HCT VFR BLD CALC: 36.4 % — SIGNIFICANT CHANGE UP (ref 34.5–45)
HDLC SERPL-MCNC: 57 MG/DL — SIGNIFICANT CHANGE UP
HGB BLD-MCNC: 11.7 G/DL — SIGNIFICANT CHANGE UP (ref 11.5–15.5)
IMM GRANULOCYTES NFR BLD AUTO: 0.3 % — SIGNIFICANT CHANGE UP (ref 0–1.5)
IRON SATN MFR SERPL: 57 UG/DL — SIGNIFICANT CHANGE UP (ref 40–150)
KETONES UR-MCNC: NEGATIVE — SIGNIFICANT CHANGE UP
LEUKOCYTE ESTERASE UR-ACNC: ABNORMAL
LIPID PNL WITH DIRECT LDL SERPL: 91 MG/DL — SIGNIFICANT CHANGE UP
LYMPHOCYTES # BLD AUTO: 1.63 K/UL — SIGNIFICANT CHANGE UP (ref 1–3.3)
LYMPHOCYTES # BLD AUTO: 27.7 % — SIGNIFICANT CHANGE UP (ref 13–44)
MAGNESIUM SERPL-MCNC: 2.6 MG/DL — SIGNIFICANT CHANGE UP (ref 1.6–2.6)
MCHC RBC-ENTMCNC: 30.7 PG — SIGNIFICANT CHANGE UP (ref 27–34)
MCHC RBC-ENTMCNC: 32.1 GM/DL — SIGNIFICANT CHANGE UP (ref 32–36)
MCV RBC AUTO: 95.5 FL — SIGNIFICANT CHANGE UP (ref 80–100)
MONOCYTES # BLD AUTO: 0.62 K/UL — SIGNIFICANT CHANGE UP (ref 0–0.9)
MONOCYTES NFR BLD AUTO: 10.5 % — SIGNIFICANT CHANGE UP (ref 2–14)
NEUTROPHILS # BLD AUTO: 3.43 K/UL — SIGNIFICANT CHANGE UP (ref 1.8–7.4)
NEUTROPHILS NFR BLD AUTO: 58.4 % — SIGNIFICANT CHANGE UP (ref 43–77)
NITRITE UR-MCNC: POSITIVE
NRBC # BLD: 0 /100 WBCS — SIGNIFICANT CHANGE UP (ref 0–0)
PH UR: 8 — SIGNIFICANT CHANGE UP (ref 5–8)
PHOSPHATE SERPL-MCNC: 3.5 MG/DL — SIGNIFICANT CHANGE UP (ref 2.5–4.5)
PLATELET # BLD AUTO: 260 K/UL — SIGNIFICANT CHANGE UP (ref 150–400)
POTASSIUM SERPL-MCNC: 3.9 MMOL/L — SIGNIFICANT CHANGE UP (ref 3.5–5.3)
POTASSIUM SERPL-SCNC: 3.9 MMOL/L — SIGNIFICANT CHANGE UP (ref 3.5–5.3)
PROT SERPL-MCNC: 7 G/DL — SIGNIFICANT CHANGE UP (ref 6–8.3)
PROT UR-MCNC: 15
RBC # BLD: 3.81 M/UL — SIGNIFICANT CHANGE UP (ref 3.8–5.2)
RBC # FLD: 13.9 % — SIGNIFICANT CHANGE UP (ref 10.3–14.5)
RBC CASTS # UR COMP ASSIST: SIGNIFICANT CHANGE UP /HPF (ref 0–2)
SODIUM SERPL-SCNC: 143 MMOL/L — SIGNIFICANT CHANGE UP (ref 135–145)
SP GR SPEC: 1.01 — SIGNIFICANT CHANGE UP (ref 1.01–1.02)
TOTAL CHOLESTEROL/HDL RATIO MEASUREMENT: 2.9 RATIO — LOW (ref 3.3–7.1)
TRIGL SERPL-MCNC: 93 MG/DL — SIGNIFICANT CHANGE UP (ref 10–149)
TSH SERPL-MCNC: 2.43 UU/ML — SIGNIFICANT CHANGE UP (ref 0.34–4.82)
UROBILINOGEN FLD QL: NEGATIVE — SIGNIFICANT CHANGE UP
VIT B12 SERPL-MCNC: 411 PG/ML — SIGNIFICANT CHANGE UP (ref 232–1245)
WBC # BLD: 5.88 K/UL — SIGNIFICANT CHANGE UP (ref 3.8–10.5)
WBC # FLD AUTO: 5.88 K/UL — SIGNIFICANT CHANGE UP (ref 3.8–10.5)
WBC UR QL: ABNORMAL /HPF (ref 0–5)

## 2019-12-08 RX ORDER — KETOROLAC TROMETHAMINE 30 MG/ML
30 SYRINGE (ML) INJECTION ONCE
Refills: 0 | Status: DISCONTINUED | OUTPATIENT
Start: 2019-12-08 | End: 2019-12-08

## 2019-12-08 RX ADMIN — Medication 650 MILLIGRAM(S): at 18:24

## 2019-12-08 RX ADMIN — Medication 30 MILLIGRAM(S): at 23:04

## 2019-12-08 RX ADMIN — Medication 0.5 MILLIGRAM(S): at 17:21

## 2019-12-08 RX ADMIN — Medication 81 MILLIGRAM(S): at 11:09

## 2019-12-08 RX ADMIN — OLANZAPINE 15 MILLIGRAM(S): 15 TABLET, FILM COATED ORAL at 21:02

## 2019-12-08 RX ADMIN — Medication 50 MICROGRAM(S): at 05:38

## 2019-12-08 RX ADMIN — Medication 250 MILLIGRAM(S): at 11:09

## 2019-12-08 RX ADMIN — Medication 650 MILLIGRAM(S): at 08:04

## 2019-12-08 RX ADMIN — Medication 0.5 MILLIGRAM(S): at 05:38

## 2019-12-08 RX ADMIN — Medication 30 MILLILITER(S): at 20:06

## 2019-12-08 RX ADMIN — ENOXAPARIN SODIUM 40 MILLIGRAM(S): 100 INJECTION SUBCUTANEOUS at 11:09

## 2019-12-08 RX ADMIN — Medication 650 MILLIGRAM(S): at 13:01

## 2019-12-08 RX ADMIN — Medication 650 MILLIGRAM(S): at 06:55

## 2019-12-08 RX ADMIN — PANTOPRAZOLE SODIUM 40 MILLIGRAM(S): 20 TABLET, DELAYED RELEASE ORAL at 06:37

## 2019-12-08 RX ADMIN — Medication 650 MILLIGRAM(S): at 10:52

## 2019-12-08 RX ADMIN — Medication 30 MILLIGRAM(S): at 22:11

## 2019-12-08 RX ADMIN — Medication 1 APPLICATION(S): at 17:21

## 2019-12-08 NOTE — CONSULT NOTE ADULT - ATTENDING COMMENTS
CARDIOLOGY ATTENDING    Agree with above. PPM site appears benign. Recommend PPM interrogation, and if unremarkable then clinical follow up with whomever implanted the device.

## 2019-12-08 NOTE — CONSULT NOTE ADULT - SUBJECTIVE AND OBJECTIVE BOX
HISTORY OF PRESENT ILLNESS:  69 F coming from [facility], wheel chair bound, with PMH of Schizoaffective disorder, MDD, AUREA, S/P Pacemaker placement ( date unknown to pt) came to the ED with complaints of pain at the Pacemaker implantation site since last night. She describes the pain to be a sharp 10/10, radiating towards L arm that woke her up from sleep last night. She tried taking Tylenol that did not help her with the pain. Pt denies any SOB, palpitations, chest pain, nausea/vomiting, diaphoresis or dizziness  PSH- Pt had b/l cataract surgery, Metallic plate placement in L hand.       PAST MEDICAL & SURGICAL HISTORY:  Anemia  GERD (gastroesophageal reflux disease)  COPD (chronic obstructive pulmonary disease)  Paranoia: CHRONIC  Schizophrenia  Osteopenia  Hypothyroidism  Hypercholesteremia  Bipolar Disorder  Pacemaker  No significant past surgical history      [x ] Diabetes   [x ] Hypertension  [x ] Hyperlipidemia  [x ] CAD  [ ] PCI  [ ] CABG    PREVIOUS DIAGNOSTIC TESTING:    [ ] Echocardiogram: < from: Transthoracic Echocardiogram (04.09.17 @ 07:23) >  CONCLUSIONS:  1. Normal mitral valve. Mild mitral regurgitation.  2. Aortic valve not well visualized; probably normal.  3. Normal aortic root.  4. Normal left atrium.  5. Normal left ventricular internal dimensions and wall  thicknesses.  6. Endocardium not well visualized; grossly normal left  ventricular function.  7. Grade II diastolic dysfunction  8. Normal right atrium.  9. Poorly imaged right ventricle. A device lead is  visualized in the right heart.  10. RA Pressure is 10 mm Hg.  11. RVS Pressureis 29 mm Hg.  12. Not well visualized, probably normal.  13. Normal pericardium with no pericardial effusion.    ------------------------------------------------------------------------  Confirmed on  4/10/2017 - 09:08:17 by Charles Scruggs M.D.  ------------------------------------------------------------------------    < end of copied text >    [ ]  Catheterization:   [ ] Stress Test:  	    MEDICATIONS:  aspirin  chewable 81 milliGRAM(s) Oral daily  enoxaparin Injectable 40 milliGRAM(s) SubCutaneous daily        acetaminophen   Tablet .. 650 milliGRAM(s) Oral daily PRN  acetaminophen   Tablet .. 650 milliGRAM(s) Oral every 6 hours PRN  clonazePAM  Tablet 0.5 milliGRAM(s) Oral two times a day  OLANZapine 15 milliGRAM(s) Oral at bedtime  valproic  acid Syrup 250 milliGRAM(s) Oral daily    pantoprazole    Tablet 40 milliGRAM(s) Oral before breakfast    levothyroxine 50 MICROGram(s) Oral daily    clotrimazole 1% Cream 1 Application(s) Topical two times a day  influenza   Vaccine 0.5 milliLiter(s) IntraMuscular once      Allergies    shellfish (Unknown)  shellfish. (Hives; Urticaria)    Intolerances        FAMILY HISTORY:  No pertinent family history in first degree relatives      SOCIAL HISTORY:    [x ] Non-smoker  [ ] Smoker  [ ] Alcohol      REVIEW OF SYSTEMS:  [ x]chest pain  [  ]shortness of breath  [  ]palpitations  [  ]syncope  [ ]near syncope [  ]diplopia  [  ]altered mental status [  ]fevers  [ ]chills [ ]nausea  [ ]vomitting  [ ]abdominal pain  [ ]melena  [ ]BRBPR  [  ]epistaxis  [  ]rash  [  ]lower extremity edema      CONSTITUTIONAL: No fever, weight loss, or fatigue  EYES: No eye pain, visual disturbances, or discharge  ENMT:  No difficulty hearing, tinnitus, vertigo; No sinus or throat pain  NECK: No pain or stiffness  RESPIRATORY: No cough, wheezing, chills or hemoptysis; No Shortness of Breath  CARDIOVASCULAR: left ppm site pain   GASTROINTESTINAL: No abdominal or epigastric pain. No nausea, vomiting, or hematemesis; No diarrhea or constipation. No melena or hematochezia.  GENITOURINARY: No dysuria, frequency, hematuria, or incontinence  NEUROLOGICAL: No headaches, memory loss, loss of strength, numbness, or tremors  SKIN: No itching, burning, rashes, or lesions   LYMPH Nodes: No enlarged glands  ENDOCRINE: No heat or cold intolerance; No hair loss  MUSCULOSKELETAL: No joint pain or swelling; No muscle, back, or extremity pain  PSYCHIATRIC: No depression, anxiety, mood swings, or difficulty sleeping  HEME/LYMPH: No easy bruising, or bleeding gums  ALLERY AND IMMUNOLOGIC: No hives or eczema	    [ ] All others negative	  [ ] Unable to obtain    PHYSICAL EXAM:  T(C): 36.7 (12-08-19 @ 04:30), Max: 36.9 (12-07-19 @ 19:05)  HR: 63 (12-08-19 @ 04:30) (63 - 72)  BP: 107/61 (12-08-19 @ 04:30) (100/50 - 138/57)  RR: 18 (12-08-19 @ 04:30) (17 - 18)  SpO2: 99% (12-08-19 @ 04:30) (96% - 99%)  Wt(kg): --  I&O's Summary      Appearance: Normal	  HEENT:   Normal oral mucosa, PERRL, EOMI	  Lymphatic: No lymphadenopathy  Cardiovascular:  reg s1s2, left chest pain ,   Respiratory: Lungs clear to auscultation	  Psychiatry: A & O x 3, Mood & affect appropriate  Gastrointestinal:  Soft, Non-tender, + BS	  Skin: No rashes, No ecchymoses, No cyanosis	  Neurologic: Non-focal  Extremities: Normal range of motion, No clubbing, cyanosis or edema  Vascular: Peripheral pulses palpable 2+ bilaterally    TELEMETRY: 	  paced/ afib   ECG:  	NSR/ paced  , no acute ischemia   RADIOLOGY: < from: Xray Chest 1 View-PORTABLE IMMEDIATE (12.07.19 @ 14:09) >  IMPRESSION:    Mild cardiomegaly. No acute infiltrate. Pacemaker.          < end of copied text >    OTHER: 	  	  LABS:	 	    CARDIAC MARKERS:  Troponin I, Serum: <0.015 ng/mL (12-07 @ 18:36)  Troponin I, Serum: <0.015 ng/mL (12-07 @ 14:05)                                  11.7   6.31  )-----------( 275      ( 07 Dec 2019 14:05 )             36.7     12-07    142  |  108  |  12  ----------------------------<  90  4.7   |  31  |  0.63    Ca    8.8      07 Dec 2019 14:05  Mg     2.7     12-07    TPro  7.1  /  Alb  3.2<L>  /  TBili  0.3  /  DBili  x   /  AST  12  /  ALT  14  /  AlkPhos  99  12-07    proBNP: Serum Pro-Brain Natriuretic Peptide: 278 pg/mL (12-07 @ 14:05)    Lipid Profile:   HgA1c:   TSH:     ASSESSMENT/PLAN:  	69 F coming from [facility], wheel chair bound, with PMH of Schizoaffective disorder, MDD, AUREA, S/P Pacemaker placement ( date unknown to pt) came to the ED with complaints of pain at the Pacemaker implantation site since last night. She describes the pain to be a sharp 10/10, radiating towards L arm that woke her up from sleep last night.     ppm site c/d/i, no edema   paced approp on tele   Interrogation pending   ASA, statin   cont all pysch meds  cardiac enzymes neg x 2  no s/s of chf on exam   D/W Dr Lara

## 2019-12-08 NOTE — PHYSICAL THERAPY INITIAL EVALUATION ADULT - ADDITIONAL COMMENTS
Pt. is poor historian  Pt .reports requiring assist w/bed mobility and transfer to wheelchair.   Pt. states she requires person to assist w/utilizing wheelchair

## 2019-12-08 NOTE — PHYSICAL THERAPY INITIAL EVALUATION ADULT - IMPAIRMENTS FOUND, PT EVAL
poor safety awareness/posture/ROM/muscle strength/gait, locomotion, and balance/cognitive impairment

## 2019-12-08 NOTE — PROGRESS NOTE ADULT - ASSESSMENT
69 F coming from [facility], wheel chair bound, with PMH of Schizoaffective disorder, MDD, AUREA, S/P Pacemaker placement ( date unknown to pt) came to the ED with complaints of pain at the Pacemaker implantation site since last night. She describes the pain to be a sharp 10/10, radiating towards L arm that woke her up from sleep last night. She tried taking Tylenol that did not help her with the pain. Pt denies any SOB, palpitations, chest pain, nausea/vomiting, diaphoresis or dizziness.  In the ED,     Pt appears comfortable, AAO X3  EKG- LBBB, Atrial paced rhythm with prolonged AV conducftion with SVT  Vitals-101/65, HR-81  CXR- mild cardiomegaly with pacemaker

## 2019-12-08 NOTE — PROGRESS NOTE ADULT - SUBJECTIVE AND OBJECTIVE BOX
PGY1 Note discussed with supervising resident and primary attending.    Patient is a 69y old  Female who presents with a chief complaint of Pain at pace maker site (08 Dec 2019 05:27)      INTERVAL HPI/OVERNIGHT EVENTS:  Complains of persistent pain around pacemaker site but no skin changes or tenderness to palpation of area  PPM interrogation pending. Pt does not know brand of ppm.   No acute events overnight  Resting comfortably.    MEDICATIONS  (STANDING):  aspirin  chewable 81 milliGRAM(s) Oral daily  clonazePAM  Tablet 0.5 milliGRAM(s) Oral two times a day  clotrimazole 1% Cream 1 Application(s) Topical two times a day  enoxaparin Injectable 40 milliGRAM(s) SubCutaneous daily  influenza   Vaccine 0.5 milliLiter(s) IntraMuscular once  levothyroxine 50 MICROGram(s) Oral daily  OLANZapine 15 milliGRAM(s) Oral at bedtime  pantoprazole    Tablet 40 milliGRAM(s) Oral before breakfast  valproic  acid Syrup 250 milliGRAM(s) Oral daily    MEDICATIONS  (PRN):  acetaminophen   Tablet .. 650 milliGRAM(s) Oral daily PRN Moderate Pain (4 - 6)  acetaminophen   Tablet .. 650 milliGRAM(s) Oral every 6 hours PRN Moderate Pain (4 - 6)        Allergies    shellfish (Unknown)  shellfish. (Hives; Urticaria)    Intolerances        REVIEW OF SYSTEMS:  CONSTITUTIONAL: No fever, weight loss, or fatigue  RESPIRATORY: No cough, wheezing, chills or hemoptysis; No shortness of breath  CARDIOVASCULAR: No chest pain, palpitations, dizziness, or leg swelling  GASTROINTESTINAL: No abdominal or epigastric pain. No nausea, vomiting, or hematemesis; No diarrhea or constipation. No melena or hematochezia.  NEUROLOGICAL: No headaches, memory loss, loss of strength, numbness, or tremors  SKIN: No itching, burning, rashes, or lesions     Vital Signs Last 24 Hrs  T(C): 36.5 (08 Dec 2019 08:09), Max: 36.9 (07 Dec 2019 19:05)  T(F): 97.7 (08 Dec 2019 08:09), Max: 98.5 (07 Dec 2019 19:05)  HR: 56 (08 Dec 2019 08:09) (56 - 72)  BP: 103/59 (08 Dec 2019 08:09) (100/50 - 138/57)  BP(mean): --  RR: 17 (08 Dec 2019 08:09) (17 - 18)  SpO2: 100% (08 Dec 2019 08:09) (96% - 100%)    PHYSICAL EXAM:  GENERAL: NAD, well-groomed, well-developed  HEAD:  Atraumatic, Normocephalic  EYES: EOMI, PERRLA, conjunctiva and sclera clear  NECK: Supple, No JVD, Normal thyroid  CHEST/LUNG: No skin changes noted around PPM site. No tenderness to palpation. Clear to percussion bilaterally; No rales, rhonchi, wheezing, or rubs  HEART: Regular rate and rhythm; No murmurs, rubs, or gallops  ABDOMEN: Soft, Nontender, Nondistended; Bowel sounds present  NERVOUS SYSTEM:  Alert & Oriented, Good concentration; Motor Strength 5/5 B/L   EXTREMITIES:  2+ Peripheral Pulses, No clubbing, cyanosis, or edema      LABS:                        11.7   5.88  )-----------( 260      ( 08 Dec 2019 07:40 )             36.4     12-08    143  |  109<H>  |  11  ----------------------------<  75  3.9   |  27  |  0.55    Ca    9.3      08 Dec 2019 07:40  Phos  3.5     12-  Mg     2.6     12-    TPro  7.0  /  Alb  3.2<L>  /  TBili  0.5  /  DBili  x   /  AST  12  /  ALT  15  /  AlkPhos  102  12-08    PT/INR - ( 07 Dec 2019 14:05 )   PT: 12.3 sec;   INR: 1.10 ratio         PTT - ( 07 Dec 2019 14:05 )  PTT:43.1 sec  Urinalysis Basic - ( 08 Dec 2019 03:44 )    Color: Yellow / Appearance: Slightly Turbid / S.010 / pH: x  Gluc: x / Ketone: Negative  / Bili: Negative / Urobili: Negative   Blood: x / Protein: 15 / Nitrite: Positive   Leuk Esterase: Moderate / RBC: 0-2 /HPF / WBC 11-25 /HPF   Sq Epi: x / Non Sq Epi: Few /HPF / Bacteria: Few /HPF      CAPILLARY BLOOD GLUCOSE          RADIOLOGY & ADDITIONAL TESTS:    Imaging Personally Reviewed:  [ ] YES  [ ] NO    Consultant(s) Notes Reviewed:  [ ] YES  [ ] NO PGY1 Note discussed with supervising resident and primary attending.    Patient is a 69y old  Female who presents with a chief complaint of Pain at pace maker site (08 Dec 2019 05:27)      INTERVAL HPI/OVERNIGHT EVENTS:  Complains of persistent pain around pacemaker site but no skin changes or tenderness to palpation of area  PPM interrogation in chart - [employer] PPM  No acute events overnight  Resting comfortably.    MEDICATIONS  (STANDING):  aspirin  chewable 81 milliGRAM(s) Oral daily  clonazePAM  Tablet 0.5 milliGRAM(s) Oral two times a day  clotrimazole 1% Cream 1 Application(s) Topical two times a day  enoxaparin Injectable 40 milliGRAM(s) SubCutaneous daily  influenza   Vaccine 0.5 milliLiter(s) IntraMuscular once  levothyroxine 50 MICROGram(s) Oral daily  OLANZapine 15 milliGRAM(s) Oral at bedtime  pantoprazole    Tablet 40 milliGRAM(s) Oral before breakfast  valproic  acid Syrup 250 milliGRAM(s) Oral daily    MEDICATIONS  (PRN):  acetaminophen   Tablet .. 650 milliGRAM(s) Oral daily PRN Moderate Pain (4 - 6)  acetaminophen   Tablet .. 650 milliGRAM(s) Oral every 6 hours PRN Moderate Pain (4 - 6)        Allergies    shellfish (Unknown)  shellfish. (Hives; Urticaria)    Intolerances        REVIEW OF SYSTEMS:  CONSTITUTIONAL: No fever, weight loss, or fatigue  RESPIRATORY: No cough, wheezing, chills or hemoptysis; No shortness of breath  CARDIOVASCULAR: No chest pain, palpitations, dizziness, or leg swelling  GASTROINTESTINAL: No abdominal or epigastric pain. No nausea, vomiting, or hematemesis; No diarrhea or constipation. No melena or hematochezia.  NEUROLOGICAL: No headaches, memory loss, loss of strength, numbness, or tremors  SKIN: No itching, burning, rashes, or lesions     Vital Signs Last 24 Hrs  T(C): 36.5 (08 Dec 2019 08:09), Max: 36.9 (07 Dec 2019 19:05)  T(F): 97.7 (08 Dec 2019 08:09), Max: 98.5 (07 Dec 2019 19:05)  HR: 56 (08 Dec 2019 08:09) (56 - 72)  BP: 103/59 (08 Dec 2019 08:09) (100/50 - 138/57)  BP(mean): --  RR: 17 (08 Dec 2019 08:09) (17 - 18)  SpO2: 100% (08 Dec 2019 08:09) (96% - 100%)    PHYSICAL EXAM:  GENERAL: NAD, well-groomed, well-developed  HEAD:  Atraumatic, Normocephalic  EYES: EOMI, PERRLA, conjunctiva and sclera clear  NECK: Supple, No JVD, Normal thyroid  CHEST/LUNG: No skin changes noted around PPM site. No tenderness to palpation. Clear to percussion bilaterally; No rales, rhonchi, wheezing, or rubs  HEART: Regular rate and rhythm; No murmurs, rubs, or gallops  ABDOMEN: Soft, Nontender, Nondistended; Bowel sounds present  NERVOUS SYSTEM:  Alert & Oriented, Good concentration; Motor Strength 5/5 B/L   EXTREMITIES:  2+ Peripheral Pulses, No clubbing, cyanosis, or edema      LABS:                        11.7   5.88  )-----------( 260      ( 08 Dec 2019 07:40 )             36.4     12-08    143  |  109<H>  |  11  ----------------------------<  75  3.9   |  27  |  0.55    Ca    9.3      08 Dec 2019 07:40  Phos  3.5     12-08  Mg     2.6     12-    TPro  7.0  /  Alb  3.2<L>  /  TBili  0.5  /  DBili  x   /  AST  12  /  ALT  15  /  AlkPhos  102  12-08    PT/INR - ( 07 Dec 2019 14:05 )   PT: 12.3 sec;   INR: 1.10 ratio         PTT - ( 07 Dec 2019 14:05 )  PTT:43.1 sec  Urinalysis Basic - ( 08 Dec 2019 03:44 )    Color: Yellow / Appearance: Slightly Turbid / S.010 / pH: x  Gluc: x / Ketone: Negative  / Bili: Negative / Urobili: Negative   Blood: x / Protein: 15 / Nitrite: Positive   Leuk Esterase: Moderate / RBC: 0-2 /HPF / WBC 11-25 /HPF   Sq Epi: x / Non Sq Epi: Few /HPF / Bacteria: Few /HPF      CAPILLARY BLOOD GLUCOSE          RADIOLOGY & ADDITIONAL TESTS:    Imaging Personally Reviewed:  [ ] YES  [ ] NO    Consultant(s) Notes Reviewed:  [ ] YES  [ ] NO

## 2019-12-08 NOTE — PHYSICAL THERAPY INITIAL EVALUATION ADULT - CRITERIA FOR SKILLED THERAPEUTIC INTERVENTIONS
functional limitations in following categories/risk reduction/prevention/rehab potential/predicted duration of therapy intervention/therapy frequency/anticipated discharge recommendation

## 2019-12-08 NOTE — PHYSICAL THERAPY INITIAL EVALUATION ADULT - ACTIVE RANGE OF MOTION EXAMINATION, REHAB EVAL
bilateral upper extremity Active ROM was WFL (within functional limits)/except at lt. wrist, no motion has plate. BLE at least 1/4 range (limited assessment secondary to cognition

## 2019-12-08 NOTE — PROGRESS NOTE ADULT - SUBJECTIVE AND OBJECTIVE BOX
Patient is a 69y old  Female who presents with a chief complaint of Pain at pace maker site (08 Dec 2019 08:54)    PATIENT IS SEEN AND EXAMINED IN MEDICAL FLOOR.    ALLERGIES:  shellfish (Unknown)  shellfish. (Hives; Urticaria)     Daily Weight in k.9 (08 Dec 2019 04:30)    VITALS:    Vital Signs Last 24 Hrs  T(C): 36.4 (08 Dec 2019 19:23), Max: 36.7 (08 Dec 2019 04:30)  T(F): 97.6 (08 Dec 2019 19:23), Max: 98.1 (08 Dec 2019 04:30)  HR: 69 (08 Dec 2019 19:23) (56 - 78)  BP: 95/44 (08 Dec 2019 19:23) (95/44 - 138/57)  BP(mean): --  RR: 18 (08 Dec 2019 19:23) (17 - 18)  SpO2: 100% (08 Dec 2019 19:23) (97% - 100%)    LABS:    CBC Full  -  ( 08 Dec 2019 07:40 )  WBC Count : 5.88 K/uL  RBC Count : 3.81 M/uL  Hemoglobin : 11.7 g/dL  Hematocrit : 36.4 %  Platelet Count - Automated : 260 K/uL  Mean Cell Volume : 95.5 fl  Mean Cell Hemoglobin : 30.7 pg  Mean Cell Hemoglobin Concentration : 32.1 gm/dL  Auto Neutrophil # : 3.43 K/uL  Auto Lymphocyte # : 1.63 K/uL  Auto Monocyte # : 0.62 K/uL  Auto Eosinophil # : 0.14 K/uL  Auto Basophil # : 0.04 K/uL  Auto Neutrophil % : 58.4 %  Auto Lymphocyte % : 27.7 %  Auto Monocyte % : 10.5 %  Auto Eosinophil % : 2.4 %  Auto Basophil % : 0.7 %    PT/INR - ( 07 Dec 2019 14:05 )   PT: 12.3 sec;   INR: 1.10 ratio         PTT - ( 07 Dec 2019 14:05 )  PTT:43.1 sec  12    143  |  109<H>  |  11  ----------------------------<  75  3.9   |  27  |  0.55    Ca    9.3      08 Dec 2019 07:40  Phos  3.5     12-08  Mg     2.6     12-08    TPro  7.0  /  Alb  3.2<L>  /  TBili  0.5  /  DBili  x   /  AST  12  /  ALT  15  /  AlkPhos  102  12      CARDIAC MARKERS ( 07 Dec 2019 18:36 )  <0.015 ng/mL / x     / x     / x     / x      CARDIAC MARKERS ( 07 Dec 2019 14:05 )  <0.015 ng/mL / x     / x     / x     / x          LIVER FUNCTIONS - ( 08 Dec 2019 07:40 )  Alb: 3.2 g/dL / Pro: 7.0 g/dL / ALK PHOS: 102 U/L / ALT: 15 U/L DA / AST: 12 U/L / GGT: x           Creatinine Trend: 0.55<--, 0.63<--  I&O's Summary    08 Dec 2019 07:01  -  08 Dec 2019 21:09  --------------------------------------------------------  IN: 690 mL / OUT: 0 mL / NET: 690 mL      MEDICATIONS:    MEDICATIONS  (STANDING):  aspirin  chewable 81 milliGRAM(s) Oral daily  clonazePAM  Tablet 0.5 milliGRAM(s) Oral two times a day  clotrimazole 1% Cream 1 Application(s) Topical two times a day  enoxaparin Injectable 40 milliGRAM(s) SubCutaneous daily  influenza   Vaccine 0.5 milliLiter(s) IntraMuscular once  levothyroxine 50 MICROGram(s) Oral daily  OLANZapine 15 milliGRAM(s) Oral at bedtime  pantoprazole    Tablet 40 milliGRAM(s) Oral before breakfast  valproic  acid Syrup 250 milliGRAM(s) Oral daily      MEDICATIONS  (PRN):  acetaminophen   Tablet .. 650 milliGRAM(s) Oral daily PRN Moderate Pain (4 - 6)  acetaminophen   Tablet .. 650 milliGRAM(s) Oral every 6 hours PRN Moderate Pain (4 - 6)      REVIEW OF SYSTEMS:                           ALL ROS DONE [ X   ]    CONSTITUTIONAL:  LETHARGIC [   ], FEVER [   ], UNRESPONSIVE [   ]  CVS:  CP  [   ], SOB, [   ], PALPITATIONS [   ], DIZZYNESS [   ]  RS: COUGH [   ], SPUTUM [   ]  GI: ABDOMINAL PAIN [   ], NAUSEA [   ], VOMITINGS [   ], DIARRHEA [   ], CONSTIPATION [   ]  :  DYSURIA [   ], NOCTURIA [   ], INCREASED FREQUENCY [   ], DRIBLING [   ],  SKELETAL: PAINFUL JOINTS [   ], SWOLLEN JOINTS [   ], NECK ACHE [   ], LOW BACK ACHE [   ],  SKIN : ULCERS [   ], RASH [   ], ITCHING [   ]  CNS: HEAD ACHE [   ], DOUBLE VISION [   ], BLURRED VISION [   ], AMS / CONFUSION [   ], SEIZURES [   ], WEAKNESS [   ],TINGLING / NUMBNESS [   ]    PHYSICAL EXAMINATION:  GENERAL APPEARANCE: NO DISTRESS  HEENT:  NO PALLOR, NO  JVD,  NO   NODES, NECK SUPPLE  CVS: S1 +, S2 +,   RS: AEEB,  OCCASIONAL  RALES +,   NO RONCHI  ABD: SOFT, NT, NO, BS +  EXT: NO PE  SKIN: WARM,   SKELETAL:  ROM ACCEPTABLE  CNS:  AAO X 1-2 , NO DEFICITS    RADIOLOGY :    < from: Xray Chest 1 View-PORTABLE IMMEDIATE (19 @ 14:09) >  IMPRESSION:    Mild cardiomegaly. No acute infiltrate. Pacemaker.    < end of copied text >      ASSESSMENT :     Acute ischemic heart disease  Anemia  GERD (gastroesophageal reflux disease)  COPD (chronic obstructive pulmonary disease)  Paranoia  Schizophrenia  Osteopenia  MR (Mental Retardation)  Hypothyroidism  Hypercholesteremia  Bipolar Disorder  Pacemaker      PLAN:  HPI:  69 F coming from [facility], wheel chair bound, with PMH of Schizoaffective disorder, MDD, AUREA, S/P Pacemaker placement ( date unknown to pt) came to the ED with complaints of pain at the Pacemaker implantation site since last night. She describes the pain to be a sharp 10/10, radiating towards L arm that woke her up from sleep last night. She tried taking Tylenol that did not help her with the pain. Pt denies any SOB, palpitations, chest pain, nausea/vomiting, diaphoresis or dizziness  PSH- Pt had b/l cataract surgery, Metallic plate placement in L hand.   SH- Denies smoking, alcoholk   FH- no significant family history     In the ED,   Pt appears comfortable, AAO X3  EKG- LBBB, Atrial paced rhythm with prolonged AV conducftion with SVT  Vitals-101/65, HR-81  CXR- mild cardiomegaly with pacemaker (07 Dec 2019 17:09)    - LEFT CHEST PAIN. ACS IS BEING RULED OUT. PACE MAKER EVALUATION AND DC  PLAN BACK TO [facility]   - GI AND DVT PROPHYLAXIS  - DR. CURIEL

## 2019-12-08 NOTE — PHYSICAL THERAPY INITIAL EVALUATION ADULT - PLANNED THERAPY INTERVENTIONS, PT EVAL
transfer training/balance training/bed mobility training/gait training/postural re-education/strengthening

## 2019-12-08 NOTE — PHYSICAL THERAPY INITIAL EVALUATION ADULT - PASSIVE RANGE OF MOTION EXAMINATION, REHAB EVAL
bilateral lower extremity Passive ROM was WFL (within functional limits)/bilateral upper extremity Passive ROM was WFL (within functional limits)/except at lt. wrist, no motion has plate

## 2019-12-08 NOTE — PHYSICAL THERAPY INITIAL EVALUATION ADULT - DIAGNOSIS, PT EVAL
Impaired balance, strength, endurance and ROM. Pain. Additional diagnoses pending further assessment of Pt. functional mobility and ability to participate

## 2019-12-08 NOTE — PROGRESS NOTE ADULT - PROBLEM SELECTOR PLAN 1
Pt c/o pain at the site of pacemaker since last night on the L side of chest. Denies palpiatations, diaphoresis, SOB  work up to r/o ACS  EKG- LBBB with Pre mature SVT complex. Afib with junctional pacemaker  on Telemetry   f/u echo   Tropsx 2 negative  f/u Pacemaker interrogation - will attempt to determine make of ppm  Cardiology consult - Dr. Ponce.   pain management- Tyelenol  f/u PT evaluation Pt c/o pain at the site of pacemaker since last night on the L side of chest. Denies palpiatations, diaphoresis, SOB  work up to r/o ACS  EKG- LBBB with Pre mature SVT complex. Afib with junctional pacemaker  on Telemetry   f/u echo   Tropsx 2 negative  PPM [employer] - Interrogation in chart  Cardiology consult - Dr. Ponce.   pain management- Tyelenol  f/u PT evaluation

## 2019-12-09 ENCOUNTER — TRANSCRIPTION ENCOUNTER (OUTPATIENT)
Age: 69
End: 2019-12-09

## 2019-12-09 LAB
ANION GAP SERPL CALC-SCNC: 6 MMOL/L — SIGNIFICANT CHANGE UP (ref 5–17)
BUN SERPL-MCNC: 21 MG/DL — HIGH (ref 7–18)
CALCIUM SERPL-MCNC: 8.7 MG/DL — SIGNIFICANT CHANGE UP (ref 8.4–10.5)
CHLORIDE SERPL-SCNC: 107 MMOL/L — SIGNIFICANT CHANGE UP (ref 96–108)
CO2 SERPL-SCNC: 27 MMOL/L — SIGNIFICANT CHANGE UP (ref 22–31)
CREAT SERPL-MCNC: 0.75 MG/DL — SIGNIFICANT CHANGE UP (ref 0.5–1.3)
GLUCOSE SERPL-MCNC: 85 MG/DL — SIGNIFICANT CHANGE UP (ref 70–99)
HCT VFR BLD CALC: 34.2 % — LOW (ref 34.5–45)
HGB BLD-MCNC: 11.1 G/DL — LOW (ref 11.5–15.5)
MAGNESIUM SERPL-MCNC: 2.4 MG/DL — SIGNIFICANT CHANGE UP (ref 1.6–2.6)
MCHC RBC-ENTMCNC: 30.9 PG — SIGNIFICANT CHANGE UP (ref 27–34)
MCHC RBC-ENTMCNC: 32.5 GM/DL — SIGNIFICANT CHANGE UP (ref 32–36)
MCV RBC AUTO: 95.3 FL — SIGNIFICANT CHANGE UP (ref 80–100)
NRBC # BLD: 0 /100 WBCS — SIGNIFICANT CHANGE UP (ref 0–0)
PHOSPHATE SERPL-MCNC: 3.8 MG/DL — SIGNIFICANT CHANGE UP (ref 2.5–4.5)
PLATELET # BLD AUTO: 234 K/UL — SIGNIFICANT CHANGE UP (ref 150–400)
POTASSIUM SERPL-MCNC: 4.2 MMOL/L — SIGNIFICANT CHANGE UP (ref 3.5–5.3)
POTASSIUM SERPL-SCNC: 4.2 MMOL/L — SIGNIFICANT CHANGE UP (ref 3.5–5.3)
RBC # BLD: 3.59 M/UL — LOW (ref 3.8–5.2)
RBC # FLD: 13.7 % — SIGNIFICANT CHANGE UP (ref 10.3–14.5)
SODIUM SERPL-SCNC: 140 MMOL/L — SIGNIFICANT CHANGE UP (ref 135–145)
WBC # BLD: 5.8 K/UL — SIGNIFICANT CHANGE UP (ref 3.8–10.5)
WBC # FLD AUTO: 5.8 K/UL — SIGNIFICANT CHANGE UP (ref 3.8–10.5)

## 2019-12-09 RX ORDER — KETOROLAC TROMETHAMINE 30 MG/ML
15 SYRINGE (ML) INJECTION ONCE
Refills: 0 | Status: DISCONTINUED | OUTPATIENT
Start: 2019-12-09 | End: 2019-12-09

## 2019-12-09 RX ORDER — ACETAMINOPHEN 500 MG
1000 TABLET ORAL ONCE
Refills: 0 | Status: COMPLETED | OUTPATIENT
Start: 2019-12-09 | End: 2019-12-09

## 2019-12-09 RX ADMIN — Medication 30 MILLILITER(S): at 20:24

## 2019-12-09 RX ADMIN — OLANZAPINE 15 MILLIGRAM(S): 15 TABLET, FILM COATED ORAL at 21:01

## 2019-12-09 RX ADMIN — PANTOPRAZOLE SODIUM 40 MILLIGRAM(S): 20 TABLET, DELAYED RELEASE ORAL at 05:12

## 2019-12-09 RX ADMIN — Medication 1 APPLICATION(S): at 18:05

## 2019-12-09 RX ADMIN — ENOXAPARIN SODIUM 40 MILLIGRAM(S): 100 INJECTION SUBCUTANEOUS at 11:22

## 2019-12-09 RX ADMIN — Medication 0.5 MILLIGRAM(S): at 18:05

## 2019-12-09 RX ADMIN — Medication 400 MILLIGRAM(S): at 04:19

## 2019-12-09 RX ADMIN — Medication 250 MILLIGRAM(S): at 11:22

## 2019-12-09 RX ADMIN — Medication 0.5 MILLIGRAM(S): at 05:14

## 2019-12-09 RX ADMIN — Medication 1000 MILLIGRAM(S): at 05:11

## 2019-12-09 RX ADMIN — Medication 650 MILLIGRAM(S): at 22:30

## 2019-12-09 RX ADMIN — Medication 650 MILLIGRAM(S): at 21:17

## 2019-12-09 RX ADMIN — Medication 81 MILLIGRAM(S): at 11:22

## 2019-12-09 RX ADMIN — Medication 1 APPLICATION(S): at 05:12

## 2019-12-09 RX ADMIN — Medication 650 MILLIGRAM(S): at 03:40

## 2019-12-09 RX ADMIN — Medication 50 MICROGRAM(S): at 05:12

## 2019-12-09 NOTE — DISCHARGE NOTE PROVIDER - NSDCMRMEDTOKEN_GEN_ALL_CORE_FT
acetaminophen 325 mg oral tablet:   aspirin 81 mg oral tablet, chewable: 1 tab(s) orally once a day  clonazePAM 0.5 mg oral tablet: 1 tab(s) orally 2 times a day  clotrimazole 1% topical cream: Apply topically to affected area 2 times a day  docusate sodium 100 mg oral capsule: 1 cap(s) orally 3 times a day  OLANZapine 15 mg oral tablet: 1 tab(s) orally once a day  omeprazole 40 mg oral delayed release capsule: 1 cap(s) orally once a day  Synthroid 50 mcg (0.05 mg) oral tablet: 1 tab(s) orally once a day  valproic acid 250 mg/5 mL oral syrup: 30 milliliter(s) orally once a day

## 2019-12-09 NOTE — DISCHARGE NOTE PROVIDER - NSDCCPCAREPLAN_GEN_ALL_CORE_FT
PRINCIPAL DISCHARGE DIAGNOSIS  Diagnosis: ACS (acute coronary syndrome)  Assessment and Plan of Treatment: You presented with chest pain and were admitted to ensure no cardiac cause. Your EKG showed normal sinus rhythm and your cardiac enzymes were negative when trended. At this time you have no evidence of cardiac ischemia. Your pacemaker was interrogated and showed no significant events.   Please follow up with your primary care provider. Please take your medications as prescribed.      SECONDARY DISCHARGE DIAGNOSES  Diagnosis: COPD (chronic obstructive pulmonary disease)  Assessment and Plan of Treatment: Continue with your nebulizers as intrusted by your primary care physician. Avoid triggers. If you're allergic to dust mites, pollens or molds, they can make your copd symptoms get worse. Cold air, exercise, fumes from chemicals or perfume, tobacco or wood smoke, and weather changes can also make asthma symptoms worse. So can common colds and sinus infections. Vaccinate with influenza vaccine yearly. Follow up with primary care physician one week after discharge.    Diagnosis: GERD (gastroesophageal reflux disease)  Assessment and Plan of Treatment: Your chest pain was likely due to gastric reflux. You should continue to take your PPI as prescribed. This will decrease the amount of acid in your stomach and help to decrease irritation in your esophagus. Please eat a balanced diet and avoid oily or fatty foods as much as possible, these may worsen your reflux. If symptoms persist, please see a gastroenterologist for possible endoscopy to further evaluate your symptoms. Please follow up with your primary care provider to ensure continued optimal management.    Diagnosis: Schizoaffective disorder  Assessment and Plan of Treatment: Your medications were continued throughout your stay. Please follow up with your primary care provider to ensure continued optimal management.    Diagnosis: Goals of care, counseling/discussion  Assessment and Plan of Treatment: An extensive goals of care conversation was held including options, risks and benefits of many medical treatments including CPR, intubation, and tube feeding. As per your best interests, you have remained FULL CODE.

## 2019-12-09 NOTE — DISCHARGE NOTE PROVIDER - HOSPITAL COURSE
69 F coming from [facility] assisted living, wheel chair bound, with PMH of Schizoaffective disorder, MDD, AUREA, S/P Pacemaker placement (date unknown to pt) came to the ED with complained of pain at the Pacemaker implantation site since last night. She described the pain to be a sharp 10/10, radiating towards L arm that woke her up from sleep last night. She tried taking Tylenol that did not help her with the pain. Pt denied any SOB, palpitations, chest pain, nausea/vomiting, diaphoresis or dizziness    PSH- Pt had b/l cataract surgery, Metallic plate placement in L hand.     SH- Denies smoking, alcohol     FH- no significant family history         She was admitted for rule out of ACS and was followed by cardiology. Troponins were negative and EKG showed LBBB with PSVT complex as well as afib with junctional pacemaker. Her pacemaker was interrogated and showed no major events.         Her home medications were continued and she is stable for discharge.

## 2019-12-09 NOTE — PROGRESS NOTE ADULT - PROBLEM SELECTOR PLAN 1
Pt c/o pain at the site of pacemaker since last night on the L side of chest. Denies palpiatations, diaphoresis, SOB  EKG- LBBB with Pre mature SVT complex. Afib with junctional pacemaker  on Telemetry   f/u echo   Tropsx 2 negative  PPM Medtronic - Interrogation in chart  Cardiology consult - Dr. Ponce.   PPM was placed by Dr. Lara - May need EP eval  pain management- Tyelenol  f/u PT evaluation

## 2019-12-09 NOTE — DISCHARGE NOTE PROVIDER - CARE PROVIDER_API CALL
Shlomo Jeffers)  Medicine  [address] [place] [address]  Phone: [phone]  Fax: [phone]  Follow Up Time: 2 weeks

## 2019-12-09 NOTE — PROGRESS NOTE ADULT - SUBJECTIVE AND OBJECTIVE BOX
Patient denies chest pain or shortness of breath.   Review of systems otherwise (-)  	  MEDICATIONS:  MEDICATIONS  (STANDING):  aspirin  chewable 81 milliGRAM(s) Oral daily  clonazePAM  Tablet 0.5 milliGRAM(s) Oral two times a day  clotrimazole 1% Cream 1 Application(s) Topical two times a day  enoxaparin Injectable 40 milliGRAM(s) SubCutaneous daily  influenza   Vaccine 0.5 milliLiter(s) IntraMuscular once  levothyroxine 50 MICROGram(s) Oral daily  OLANZapine 15 milliGRAM(s) Oral at bedtime  pantoprazole    Tablet 40 milliGRAM(s) Oral before breakfast  valproic  acid Syrup 250 milliGRAM(s) Oral daily      LABS:	 	    CARDIAC MARKERS:  CARDIAC MARKERS ( 07 Dec 2019 18:36 )  <0.015 ng/mL / x     / x     / x     / x      CARDIAC MARKERS ( 07 Dec 2019 14:05 )  <0.015 ng/mL / x     / x     / x     / x                                    11.1   5.80  )-----------( 234      ( 09 Dec 2019 07:17 )             34.2     Hemoglobin: 11.1 g/dL (12-09 @ 07:17)  Hemoglobin: 11.7 g/dL (12-08 @ 07:40)  Hemoglobin: 11.7 g/dL (12-07 @ 14:05)      12-09    140  |  107  |  21<H>  ----------------------------<  85  4.2   |  27  |  0.75    Ca    8.7      09 Dec 2019 07:17  Phos  3.8     12-09  Mg     2.4     12-09    TPro  7.0  /  Alb  3.2<L>  /  TBili  0.5  /  DBili  x   /  AST  12  /  ALT  15  /  AlkPhos  102  12-08    Creatinine Trend: 0.75<--, 0.55<--, 0.63<--        PHYSICAL EXAM:  T(C): 36.3 (12-09-19 @ 11:23), Max: 37.1 (12-08-19 @ 23:30)  HR: 71 (12-09-19 @ 11:23) (65 - 78)  BP: 93/50 (12-09-19 @ 11:23) (93/50 - 110/59)  RR: 17 (12-09-19 @ 11:23) (17 - 18)  SpO2: 98% (12-09-19 @ 11:23) (96% - 100%)  Wt(kg): --  I&O's Summary    08 Dec 2019 07:01  -  09 Dec 2019 07:00  --------------------------------------------------------  IN: 690 mL / OUT: 1400 mL / NET: -710 mL          Gen: Appears well in NAD  HEENT:  (-)icterus (-)pallor  CV: N S1 S2 1/6 VICKI (+)2 Pulses B/l  Resp:  Clear to ausculatation B/L, normal effort  GI: (+) BS Soft, NT, ND  Lymph:  (-)Edema, (-)obvious lymphadenopathy  Skin: Warm to touch, Normal turgor  Psych: Appropriate mood and affect      TELEMETRY: 	  Paced        ASSESSMENT/PLAN: 	69y  Female oming from [facility] living, wheel chair bound, with PMH of Schizoaffective disorder, MDD, AUREA, S/P Pacemaker placement ( date unknown to pt) came to the ED with complaints of pain at the Pacemaker implantation site since last night. She describes the pain to be a sharp 10/10, radiating towards L arm that woke her up from sleep last night.     - PPM site benign appearing  - dual chamber fx WNL  - No need for further inpatient cardiac work up.  - D/C plan per med      Fly Ponce MD, [facility]  BEEPER [phone]

## 2019-12-09 NOTE — PROGRESS NOTE ADULT - ASSESSMENT
69 F coming from [facility] assisted living, wheel chair bound, with PMH of Schizoaffective disorder, MDD, AUREA, S/P Pacemaker placement ( date unknown to pt) came to the ED with complaints of pain at the Pacemaker implantation site since last night. She describes the pain to be a sharp 10/10, radiating towards L arm that woke her up from sleep last night. She tried taking Tylenol that did not help her with the pain. Pt denies any SOB, palpitations, chest pain, nausea/vomiting, diaphoresis or dizziness.  In the ED,     Pt appears comfortable, AAO X3  EKG- LBBB, Atrial paced rhythm with prolonged AV conducftion with SVT  Vitals-101/65, HR-81  CXR- mild cardiomegaly with pacemaker

## 2019-12-09 NOTE — PROGRESS NOTE ADULT - SUBJECTIVE AND OBJECTIVE BOX
PGY1 Note discussed with supervising resident and primary attending.    Patient is a 69y old  Female who presents with a chief complaint of Pain at pace maker site (08 Dec 2019 05:27)      INTERVAL HPI/OVERNIGHT EVENTS:  Complains of persistent pain around pacemaker site but no skin changes or tenderness to palpation of area  PPM interrogation in chart - Medtronic PPM  No acute events overnight  Resting comfortably.    MEDICATIONS  (STANDING):  aspirin  chewable 81 milliGRAM(s) Oral daily  clonazePAM  Tablet 0.5 milliGRAM(s) Oral two times a day  clotrimazole 1% Cream 1 Application(s) Topical two times a day  enoxaparin Injectable 40 milliGRAM(s) SubCutaneous daily  influenza   Vaccine 0.5 milliLiter(s) IntraMuscular once  levothyroxine 50 MICROGram(s) Oral daily  OLANZapine 15 milliGRAM(s) Oral at bedtime  pantoprazole    Tablet 40 milliGRAM(s) Oral before breakfast  valproic  acid Syrup 250 milliGRAM(s) Oral daily    MEDICATIONS  (PRN):  acetaminophen   Tablet .. 650 milliGRAM(s) Oral daily PRN Moderate Pain (4 - 6)  acetaminophen   Tablet .. 650 milliGRAM(s) Oral every 6 hours PRN Moderate Pain (4 - 6)        Allergies    shellfish (Unknown)  shellfish. (Hives; Urticaria)    Intolerances        REVIEW OF SYSTEMS:  CONSTITUTIONAL: No fever, weight loss, or fatigue  RESPIRATORY: No cough, wheezing, chills or hemoptysis; No shortness of breath  CARDIOVASCULAR: No chest pain, palpitations, dizziness, or leg swelling  GASTROINTESTINAL: No abdominal or epigastric pain. No nausea, vomiting, or hematemesis; No diarrhea or constipation. No melena or hematochezia.  NEUROLOGICAL: No headaches, memory loss, loss of strength, numbness, or tremors  SKIN: No itching, burning, rashes, or lesions     Vital Signs Last 24 Hrs  T(C): 36.5 (09 Dec 2019 07:43), Max: 37.1 (08 Dec 2019 23:30)  T(F): 97.7 (09 Dec 2019 07:43), Max: 98.7 (08 Dec 2019 23:30)  HR: 70 (09 Dec 2019 07:43) (65 - 78)  BP: 104/62 (09 Dec 2019 07:43) (95/44 - 110/59)  BP(mean): --  RR: 17 (09 Dec 2019 07:43) (17 - 18)  SpO2: 96% (09 Dec 2019 07:43) (96% - 100%)    PHYSICAL EXAM:  GENERAL: NAD, well-groomed, well-developed  HEAD:  Atraumatic, Normocephalic  EYES: EOMI, PERRLA, conjunctiva and sclera clear  NECK: Supple, No JVD, Normal thyroid  CHEST/LUNG: No skin changes noted around PPM site. No tenderness to palpation. Clear to percussion bilaterally; No rales, rhonchi, wheezing, or rubs  HEART: Regular rate and rhythm; No murmurs, rubs, or gallops  ABDOMEN: Soft, Nontender, Nondistended; Bowel sounds present  NERVOUS SYSTEM:  Alert & Oriented, Good concentration; Motor Strength 5/5 B/L   EXTREMITIES:  2+ Peripheral Pulses, No clubbing, cyanosis, or edema      LABS:                                   11.1   5.80  )-----------( 234      ( 09 Dec 2019 07:17 )             34.2           140  |  107  |  21<H>  ----------------------------<  85  4.2   |  27  |  0.75    Ca    8.7      09 Dec 2019 07:17  Phos  3.8       Mg     2.4         TPro  7.0  /  Alb  3.2<L>  /  TBili  0.5  /  DBili  x   /  AST  12  /  ALT  15  /  AlkPhos  102  12      PT/INR - ( 07 Dec 2019 14:05 )   PT: 12.3 sec;   INR: 1.10 ratio         PTT - ( 07 Dec 2019 14:05 )  PTT:43.1 sec  Urinalysis Basic - ( 08 Dec 2019 03:44 )    Color: Yellow / Appearance: Slightly Turbid / S.010 / pH: x  Gluc: x / Ketone: Negative  / Bili: Negative / Urobili: Negative   Blood: x / Protein: 15 / Nitrite: Positive   Leuk Esterase: Moderate / RBC: 0-2 /HPF / WBC 11-25 /HPF   Sq Epi: x / Non Sq Epi: Few /HPF / Bacteria: Few /HPF      CAPILLARY BLOOD GLUCOSE          RADIOLOGY & ADDITIONAL TESTS:    Imaging Personally Reviewed:  [ ] YES  [ ] NO    Consultant(s) Notes Reviewed:  [ ] YES  [ ] NO

## 2019-12-10 ENCOUNTER — TRANSCRIPTION ENCOUNTER (OUTPATIENT)
Age: 69
End: 2019-12-10

## 2019-12-10 LAB
BASE EXCESS BLDA CALC-SCNC: 2.3 MMOL/L — HIGH (ref -2–2)
BLOOD GAS COMMENTS ARTERIAL: SIGNIFICANT CHANGE UP
GLUCOSE BLDC GLUCOMTR-MCNC: 80 MG/DL — SIGNIFICANT CHANGE UP (ref 70–99)
HCO3 BLDA-SCNC: 26 MMOL/L — SIGNIFICANT CHANGE UP (ref 23–27)
HOROWITZ INDEX BLDA+IHG-RTO: 28 — SIGNIFICANT CHANGE UP
PCO2 BLDA: 38 MMHG — SIGNIFICANT CHANGE UP (ref 32–46)
PH BLDA: 7.45 — SIGNIFICANT CHANGE UP (ref 7.35–7.45)
PO2 BLDA: 75 MMHG — SIGNIFICANT CHANGE UP (ref 74–108)
SAO2 % BLDA: 95 % — SIGNIFICANT CHANGE UP (ref 92–96)

## 2019-12-10 RX ORDER — NALOXONE HYDROCHLORIDE 4 MG/.1ML
0.4 SPRAY NASAL ONCE
Refills: 0 | Status: DISCONTINUED | OUTPATIENT
Start: 2019-12-10 | End: 2019-12-10

## 2019-12-10 RX ORDER — DEXTROSE 50 % IN WATER 50 %
50 SYRINGE (ML) INTRAVENOUS ONCE
Refills: 0 | Status: COMPLETED | OUTPATIENT
Start: 2019-12-10 | End: 2019-12-10

## 2019-12-10 RX ORDER — ACETAMINOPHEN 500 MG
1000 TABLET ORAL ONCE
Refills: 0 | Status: COMPLETED | OUTPATIENT
Start: 2019-12-10 | End: 2019-12-10

## 2019-12-10 RX ADMIN — Medication 0.5 MILLIGRAM(S): at 06:22

## 2019-12-10 RX ADMIN — Medication 50 MILLILITER(S): at 11:30

## 2019-12-10 RX ADMIN — Medication 650 MILLIGRAM(S): at 03:25

## 2019-12-10 RX ADMIN — Medication 30 MILLILITER(S): at 14:08

## 2019-12-10 RX ADMIN — OLANZAPINE 15 MILLIGRAM(S): 15 TABLET, FILM COATED ORAL at 21:01

## 2019-12-10 RX ADMIN — Medication 0.5 MILLIGRAM(S): at 17:36

## 2019-12-10 RX ADMIN — Medication 250 MILLIGRAM(S): at 12:40

## 2019-12-10 RX ADMIN — Medication 30 MILLILITER(S): at 04:16

## 2019-12-10 RX ADMIN — Medication 50 MICROGRAM(S): at 06:19

## 2019-12-10 RX ADMIN — Medication 650 MILLIGRAM(S): at 05:00

## 2019-12-10 RX ADMIN — Medication 1000 MILLIGRAM(S): at 08:38

## 2019-12-10 RX ADMIN — Medication 400 MILLIGRAM(S): at 08:08

## 2019-12-10 RX ADMIN — Medication 1 APPLICATION(S): at 17:36

## 2019-12-10 RX ADMIN — PANTOPRAZOLE SODIUM 40 MILLIGRAM(S): 20 TABLET, DELAYED RELEASE ORAL at 06:19

## 2019-12-10 RX ADMIN — Medication 81 MILLIGRAM(S): at 12:40

## 2019-12-10 RX ADMIN — Medication 1 APPLICATION(S): at 06:19

## 2019-12-10 RX ADMIN — ENOXAPARIN SODIUM 40 MILLIGRAM(S): 100 INJECTION SUBCUTANEOUS at 12:40

## 2019-12-10 RX ADMIN — Medication 30 MILLILITER(S): at 19:03

## 2019-12-10 NOTE — DISCHARGE NOTE NURSING/CASE MANAGEMENT/SOCIAL WORK - PATIENT PORTAL LINK FT
You can access the FollowMyHealth Patient Portal offered by [facility] by registering at the following website: [email or web address]. By joining [employer]’s FollowMyHealth portal, you will also be able to view your health information using other applications (apps) compatible with our system.

## 2019-12-10 NOTE — PROGRESS NOTE ADULT - SUBJECTIVE AND OBJECTIVE BOX
Events noted was difficult to arouse this morning got Narcan with improvement  Review of systems otherwise (-)  	  MEDICATIONS:  MEDICATIONS  (STANDING):  aspirin  chewable 81 milliGRAM(s) Oral daily  clonazePAM  Tablet 0.5 milliGRAM(s) Oral two times a day  clotrimazole 1% Cream 1 Application(s) Topical two times a day  enoxaparin Injectable 40 milliGRAM(s) SubCutaneous daily  influenza   Vaccine 0.5 milliLiter(s) IntraMuscular once  levothyroxine 50 MICROGram(s) Oral daily  OLANZapine 15 milliGRAM(s) Oral at bedtime  pantoprazole    Tablet 40 milliGRAM(s) Oral before breakfast  valproic  acid Syrup 250 milliGRAM(s) Oral daily      LABS:	 	    CARDIAC MARKERS:  CARDIAC MARKERS ( 07 Dec 2019 18:36 )  <0.015 ng/mL / x     / x     / x     / x      CARDIAC MARKERS ( 07 Dec 2019 14:05 )  <0.015 ng/mL / x     / x     / x     / x                                    11.1   5.80  )-----------( 234      ( 09 Dec 2019 07:17 )             34.2     Hemoglobin: 11.1 g/dL (12-09 @ 07:17)  Hemoglobin: 11.7 g/dL (12-08 @ 07:40)  Hemoglobin: 11.7 g/dL (12-07 @ 14:05)      12-09    140  |  107  |  21<H>  ----------------------------<  85  4.2   |  27  |  0.75    Ca    8.7      09 Dec 2019 07:17  Phos  3.8     12-09  Mg     2.4     12-09    TPro  7.0  /  Alb  3.2<L>  /  TBili  0.5  /  DBili  x   /  AST  12  /  ALT  15  /  AlkPhos  102  12-08    Creatinine Trend: 0.75<--, 0.55<--, 0.63<--        PHYSICAL EXAM:  T(C): 36.3 (12-09-19 @ 11:23), Max: 37.1 (12-08-19 @ 23:30)  HR: 71 (12-09-19 @ 11:23) (65 - 78)  BP: 93/50 (12-09-19 @ 11:23) (93/50 - 110/59)  RR: 17 (12-09-19 @ 11:23) (17 - 18)  SpO2: 98% (12-09-19 @ 11:23) (96% - 100%)  Wt(kg): --  I&O's Summary    08 Dec 2019 07:01  -  09 Dec 2019 07:00  --------------------------------------------------------  IN: 690 mL / OUT: 1400 mL / NET: -710 mL          Gen: Appears well in NAD  HEENT:  (-)icterus (-)pallor  CV: N S1 S2 1/6 VICKI (+)2 Pulses B/l  Resp:  Clear to ausculatation B/L, normal effort  GI: (+) BS Soft, NT, ND  Lymph:  (-)Edema, (-)obvious lymphadenopathy  Skin: Warm to touch, Normal turgor  Psych: Appropriate mood and affect      TELEMETRY: 	OFF        ASSESSMENT/PLAN: 	69y  Female oming from [facility] living, wheel chair bound, with PMH of Schizoaffective disorder, MDD, AUREA, S/P Pacemaker placement ( date unknown to pt) came to the ED with complaints of pain at the Pacemaker implantation site since last night. She describes the pain to be a sharp 10/10, radiating towards L arm that woke her up from sleep last night.     - PPM site benign appearing  - dual chamber fx WNL  - No need for further inpatient cardiac work up.  - Monitor mental status   - D/C plan per med      Fly Ponce MD, [facility]  BEEPER [phone]

## 2019-12-10 NOTE — CHART NOTE - NSCHARTNOTEFT_GEN_A_CORE
Rapid Response Note    Rapid response was called due to unresponsiveness. Patient was evaluated at bedside in NAD.   VS were as followed: BP: 112/67 NJ: 101 RR: 18 Temp: 98.9 Saturating at Nasal Canula 98 %  Physical exam WNL. Awake and alter, respond to painful stimuli but drowsy.  Patient received clonazepam in AM. For this reason Narcan was administered during the rapid. Patient currently malingering and was awake upon talking to her PCP Dr Jeffers. Will continue to monitor if any changes however no need for any additional interventions.

## 2019-12-10 NOTE — PROGRESS NOTE ADULT - ASSESSMENT
69 F coming from [facility] assisted living, wheel chair bound, with PMH of Schizoaffective disorder, MDD, AUREA, S/P Pacemaker placement ( date unknown to pt) came to the ED with complaints of pain at the Pacemaker implantation site since last night. She describes the pain to be a sharp 10/10, radiating towards L arm that woke her up from sleep last night. She tried taking Tylenol that did not help her with the pain. Pt denies any SOB, palpitations, chest pain, nausea/vomiting, diaphoresis or dizziness.  In the ED,     Pt appears comfortable, AAO X3  EKG- LBBB, Atrial paced rhythm with prolonged AV conducftion with SVT  Vitals-101/65, HR-81  CXR- mild cardiomegaly with pacemaker

## 2019-12-10 NOTE — PROGRESS NOTE ADULT - SUBJECTIVE AND OBJECTIVE BOX
PGY1 Note discussed with supervising resident and primary attending.    Patient is a 69y old  Female who presents with a chief complaint of Pain at pace maker site (08 Dec 2019 05:27)      INTERVAL HPI/OVERNIGHT EVENTS:  RRT called for unresponsiveness  Pt malingering. Alert when told Dr. taylor's name.   Pt is medically stable for discharge and pending case management set up to return to LTC    MEDICATIONS  (STANDING):  aspirin  chewable 81 milliGRAM(s) Oral daily  clonazePAM  Tablet 0.5 milliGRAM(s) Oral two times a day  clotrimazole 1% Cream 1 Application(s) Topical two times a day  enoxaparin Injectable 40 milliGRAM(s) SubCutaneous daily  levothyroxine 50 MICROGram(s) Oral daily  OLANZapine 15 milliGRAM(s) Oral at bedtime  pantoprazole    Tablet 40 milliGRAM(s) Oral before breakfast  valproic  acid Syrup 250 milliGRAM(s) Oral daily    MEDICATIONS  (PRN):  acetaminophen   Tablet .. 650 milliGRAM(s) Oral every 6 hours PRN Moderate Pain (4 - 6)  aluminum hydroxide/magnesium hydroxide/simethicone Suspension 30 milliLiter(s) Oral every 6 hours PRN Dyspepsia          Allergies    shellfish (Unknown)  shellfish. (Hives; Urticaria)    Intolerances        REVIEW OF SYSTEMS:  CONSTITUTIONAL: No fever, weight loss, or fatigue  RESPIRATORY: No cough, wheezing, chills or hemoptysis; No shortness of breath  CARDIOVASCULAR: No chest pain, palpitations, dizziness, or leg swelling  GASTROINTESTINAL: No abdominal or epigastric pain. No nausea, vomiting, or hematemesis; No diarrhea or constipation. No melena or hematochezia.  NEUROLOGICAL: No headaches, memory loss, loss of strength, numbness, or tremors  SKIN: No itching, burning, rashes, or lesions     Vital Signs Last 24 Hrs  T(C): 36.7 (10 Dec 2019 07:10), Max: 37 (09 Dec 2019 20:37)  T(F): 98 (10 Dec 2019 07:10), Max: 98.6 (09 Dec 2019 20:37)  HR: 84 (10 Dec 2019 07:10) (69 - 84)  BP: 114/79 (10 Dec 2019 07:10) (105/64 - 119/75)  BP(mean): --  RR: 18 (10 Dec 2019 07:10) (17 - 18)  SpO2: 94% (10 Dec 2019 07:10) (92% - 97%)    PHYSICAL EXAM:  GENERAL: NAD, well-groomed, well-developed  HEAD:  Atraumatic, Normocephalic  EYES: EOMI, PERRLA, conjunctiva and sclera clear  NECK: Supple, No JVD, Normal thyroid  CHEST/LUNG: No skin changes noted around PPM site. No tenderness to palpation. Clear to percussion bilaterally; No rales, rhonchi, wheezing, or rubs  HEART: Regular rate and rhythm; No murmurs, rubs, or gallops  ABDOMEN: Soft, Nontender, Nondistended; Bowel sounds present  NERVOUS SYSTEM:  Alert & Oriented, Good concentration; Motor Strength 5/5 B/L   EXTREMITIES:  2+ Peripheral Pulses, No clubbing, cyanosis, or edema      LABS:                                              11.1   5.80  )-----------( 234      ( 09 Dec 2019 07:17 )             34.2         12-    12-    140  |  107  |  21<H>  ----------------------------<  85  4.2   |  27  |  0.75    Ca    8.7      09 Dec 2019 07:17  Phos  3.8     -  Mg     2.4         TPro  7.0  /  Alb  3.2<L>  /  TBili  0.5  /  DBili  x   /  AST  12  /  ALT  15  /  AlkPhos  102  12-08      PT/INR - ( 07 Dec 2019 14:05 )   PT: 12.3 sec;   INR: 1.10 ratio         PTT - ( 07 Dec 2019 14:05 )  PTT:43.1 sec  Urinalysis Basic - ( 08 Dec 2019 03:44 )    Color: Yellow / Appearance: Slightly Turbid / S.010 / pH: x  Gluc: x / Ketone: Negative  / Bili: Negative / Urobili: Negative   Blood: x / Protein: 15 / Nitrite: Positive   Leuk Esterase: Moderate / RBC: 0-2 /HPF / WBC 11-25 /HPF   Sq Epi: x / Non Sq Epi: Few /HPF / Bacteria: Few /HPF      CAPILLARY BLOOD GLUCOSE          RADIOLOGY & ADDITIONAL TESTS:    Imaging Personally Reviewed:  [ ] YES  [ ] NO    Consultant(s) Notes Reviewed:  [ ] YES  [ ] NO

## 2019-12-11 VITALS
HEART RATE: 70 BPM | DIASTOLIC BLOOD PRESSURE: 73 MMHG | OXYGEN SATURATION: 98 % | TEMPERATURE: 97 F | SYSTOLIC BLOOD PRESSURE: 119 MMHG | RESPIRATION RATE: 17 BRPM

## 2019-12-11 LAB
ALBUMIN SERPL ELPH-MCNC: 3.3 G/DL — LOW (ref 3.5–5)
ALP SERPL-CCNC: 105 U/L — SIGNIFICANT CHANGE UP (ref 40–120)
ALT FLD-CCNC: 29 U/L DA — SIGNIFICANT CHANGE UP (ref 10–60)
ANION GAP SERPL CALC-SCNC: 4 MMOL/L — LOW (ref 5–17)
AST SERPL-CCNC: 17 U/L — SIGNIFICANT CHANGE UP (ref 10–40)
BASE EXCESS BLDA CALC-SCNC: 2.3 MMOL/L — HIGH (ref -2–2)
BASOPHILS # BLD AUTO: 0.03 K/UL — SIGNIFICANT CHANGE UP (ref 0–0.2)
BASOPHILS NFR BLD AUTO: 0.4 % — SIGNIFICANT CHANGE UP (ref 0–2)
BILIRUB SERPL-MCNC: 0.4 MG/DL — SIGNIFICANT CHANGE UP (ref 0.2–1.2)
BLOOD GAS COMMENTS ARTERIAL: SIGNIFICANT CHANGE UP
BUN SERPL-MCNC: 19 MG/DL — HIGH (ref 7–18)
CALCIUM SERPL-MCNC: 8.9 MG/DL — SIGNIFICANT CHANGE UP (ref 8.4–10.5)
CHLORIDE SERPL-SCNC: 110 MMOL/L — HIGH (ref 96–108)
CO2 SERPL-SCNC: 28 MMOL/L — SIGNIFICANT CHANGE UP (ref 22–31)
CREAT SERPL-MCNC: 0.6 MG/DL — SIGNIFICANT CHANGE UP (ref 0.5–1.3)
EOSINOPHIL # BLD AUTO: 0.19 K/UL — SIGNIFICANT CHANGE UP (ref 0–0.5)
EOSINOPHIL NFR BLD AUTO: 2.6 % — SIGNIFICANT CHANGE UP (ref 0–6)
GLUCOSE BLDC GLUCOMTR-MCNC: 95 MG/DL — SIGNIFICANT CHANGE UP (ref 70–99)
GLUCOSE SERPL-MCNC: 87 MG/DL — SIGNIFICANT CHANGE UP (ref 70–99)
HCO3 BLDA-SCNC: 26 MMOL/L — SIGNIFICANT CHANGE UP (ref 23–27)
HCT VFR BLD CALC: 37.4 % — SIGNIFICANT CHANGE UP (ref 34.5–45)
HGB BLD-MCNC: 12.1 G/DL — SIGNIFICANT CHANGE UP (ref 11.5–15.5)
HOROWITZ INDEX BLDA+IHG-RTO: 24 — SIGNIFICANT CHANGE UP
IMM GRANULOCYTES NFR BLD AUTO: 0.3 % — SIGNIFICANT CHANGE UP (ref 0–1.5)
LACTATE SERPL-SCNC: 0.9 MMOL/L — SIGNIFICANT CHANGE UP (ref 0.7–2)
LYMPHOCYTES # BLD AUTO: 1.72 K/UL — SIGNIFICANT CHANGE UP (ref 1–3.3)
LYMPHOCYTES # BLD AUTO: 24 % — SIGNIFICANT CHANGE UP (ref 13–44)
MAGNESIUM SERPL-MCNC: 2 MG/DL — SIGNIFICANT CHANGE UP (ref 1.6–2.6)
MCHC RBC-ENTMCNC: 30.6 PG — SIGNIFICANT CHANGE UP (ref 27–34)
MCHC RBC-ENTMCNC: 32.4 GM/DL — SIGNIFICANT CHANGE UP (ref 32–36)
MCV RBC AUTO: 94.4 FL — SIGNIFICANT CHANGE UP (ref 80–100)
MONOCYTES # BLD AUTO: 0.73 K/UL — SIGNIFICANT CHANGE UP (ref 0–0.9)
MONOCYTES NFR BLD AUTO: 10.2 % — SIGNIFICANT CHANGE UP (ref 2–14)
NEUTROPHILS # BLD AUTO: 4.49 K/UL — SIGNIFICANT CHANGE UP (ref 1.8–7.4)
NEUTROPHILS NFR BLD AUTO: 62.5 % — SIGNIFICANT CHANGE UP (ref 43–77)
NRBC # BLD: 0 /100 WBCS — SIGNIFICANT CHANGE UP (ref 0–0)
PCO2 BLDA: 37 MMHG — SIGNIFICANT CHANGE UP (ref 32–46)
PH BLDA: 7.46 — HIGH (ref 7.35–7.45)
PHOSPHATE SERPL-MCNC: 2.9 MG/DL — SIGNIFICANT CHANGE UP (ref 2.5–4.5)
PLATELET # BLD AUTO: 224 K/UL — SIGNIFICANT CHANGE UP (ref 150–400)
PO2 BLDA: 85 MMHG — SIGNIFICANT CHANGE UP (ref 74–108)
POTASSIUM SERPL-MCNC: 4.3 MMOL/L — SIGNIFICANT CHANGE UP (ref 3.5–5.3)
POTASSIUM SERPL-SCNC: 4.3 MMOL/L — SIGNIFICANT CHANGE UP (ref 3.5–5.3)
PROT SERPL-MCNC: 7.1 G/DL — SIGNIFICANT CHANGE UP (ref 6–8.3)
RBC # BLD: 3.96 M/UL — SIGNIFICANT CHANGE UP (ref 3.8–5.2)
RBC # FLD: 13.7 % — SIGNIFICANT CHANGE UP (ref 10.3–14.5)
SAO2 % BLDA: 97 % — HIGH (ref 92–96)
SODIUM SERPL-SCNC: 142 MMOL/L — SIGNIFICANT CHANGE UP (ref 135–145)
TROPONIN I SERPL-MCNC: <0.015 NG/ML — SIGNIFICANT CHANGE UP (ref 0–0.04)
WBC # BLD: 7.18 K/UL — SIGNIFICANT CHANGE UP (ref 3.8–10.5)
WBC # FLD AUTO: 7.18 K/UL — SIGNIFICANT CHANGE UP (ref 3.8–10.5)

## 2019-12-11 PROCEDURE — 99285 EMERGENCY DEPT VISIT HI MDM: CPT | Mod: 25

## 2019-12-11 PROCEDURE — 82803 BLOOD GASES ANY COMBINATION: CPT

## 2019-12-11 PROCEDURE — 80048 BASIC METABOLIC PNL TOTAL CA: CPT

## 2019-12-11 PROCEDURE — 82009 KETONE BODYS QUAL: CPT

## 2019-12-11 PROCEDURE — 83880 ASSAY OF NATRIURETIC PEPTIDE: CPT

## 2019-12-11 PROCEDURE — 96374 THER/PROPH/DIAG INJ IV PUSH: CPT

## 2019-12-11 PROCEDURE — 82607 VITAMIN B-12: CPT

## 2019-12-11 PROCEDURE — 83605 ASSAY OF LACTIC ACID: CPT

## 2019-12-11 PROCEDURE — 93005 ELECTROCARDIOGRAM TRACING: CPT

## 2019-12-11 PROCEDURE — 83036 HEMOGLOBIN GLYCOSYLATED A1C: CPT

## 2019-12-11 PROCEDURE — 83690 ASSAY OF LIPASE: CPT

## 2019-12-11 PROCEDURE — 97162 PT EVAL MOD COMPLEX 30 MIN: CPT

## 2019-12-11 PROCEDURE — 81001 URINALYSIS AUTO W/SCOPE: CPT

## 2019-12-11 PROCEDURE — 84484 ASSAY OF TROPONIN QUANT: CPT

## 2019-12-11 PROCEDURE — 36415 COLL VENOUS BLD VENIPUNCTURE: CPT

## 2019-12-11 PROCEDURE — 80061 LIPID PANEL: CPT

## 2019-12-11 PROCEDURE — 71045 X-RAY EXAM CHEST 1 VIEW: CPT

## 2019-12-11 PROCEDURE — 93306 TTE W/DOPPLER COMPLETE: CPT

## 2019-12-11 PROCEDURE — 84100 ASSAY OF PHOSPHORUS: CPT

## 2019-12-11 PROCEDURE — 83735 ASSAY OF MAGNESIUM: CPT

## 2019-12-11 PROCEDURE — 85610 PROTHROMBIN TIME: CPT

## 2019-12-11 PROCEDURE — 82962 GLUCOSE BLOOD TEST: CPT

## 2019-12-11 PROCEDURE — 83540 ASSAY OF IRON: CPT

## 2019-12-11 PROCEDURE — 80053 COMPREHEN METABOLIC PANEL: CPT

## 2019-12-11 PROCEDURE — 85730 THROMBOPLASTIN TIME PARTIAL: CPT

## 2019-12-11 PROCEDURE — 84443 ASSAY THYROID STIM HORMONE: CPT

## 2019-12-11 PROCEDURE — 85027 COMPLETE CBC AUTOMATED: CPT

## 2019-12-11 PROCEDURE — 82746 ASSAY OF FOLIC ACID SERUM: CPT

## 2019-12-11 RX ORDER — NALOXONE HYDROCHLORIDE 4 MG/.1ML
0.4 SPRAY NASAL ONCE
Refills: 0 | Status: COMPLETED | OUTPATIENT
Start: 2019-12-11 | End: 2019-12-11

## 2019-12-11 RX ADMIN — Medication 50 MICROGRAM(S): at 06:39

## 2019-12-11 RX ADMIN — NALOXONE HYDROCHLORIDE 0.4 MILLIGRAM(S): 4 SPRAY NASAL at 06:54

## 2019-12-11 RX ADMIN — PANTOPRAZOLE SODIUM 40 MILLIGRAM(S): 20 TABLET, DELAYED RELEASE ORAL at 06:39

## 2019-12-11 RX ADMIN — Medication 1 APPLICATION(S): at 06:39

## 2019-12-11 RX ADMIN — Medication 0.5 MILLIGRAM(S): at 06:41

## 2019-12-11 NOTE — PROGRESS NOTE ADULT - REASON FOR ADMISSION
Pain at pace maker site

## 2019-12-11 NOTE — CHART NOTE - NSCHARTNOTEFT_GEN_A_CORE
RRT called on account of unresponsiveness.    Patient seen and examined at bedside, following verbal commands but not opening her eyes. No focal weakness. Vitals stable. ECG unchanged from prior. When inquired if she wanted to speak to Dr Jeffers she nodded her head and said yes. Suspecting psychogenic component.     Ordering routinary labs. Primary team to follow up in AM. RRT called on account of unresponsiveness.    Patient seen and examined at bedside, following verbal commands but not opening her eyes. No focal weakness. Vitals stable. ECG unchanged from prior. When inquired if she wanted to speak to Dr Jeffers she nodded her head and said yes. Suspecting psychogenic component. ?Conversion disorder    Ordering routinary labs. Primary team to follow up in AM. Consider psych consultation. RRT called on account of unresponsiveness.    Patient seen and examined at bedside, following verbal commands but not opening her eyes. No focal weakness. Vitals stable. ECG unchanged from prior. When inquired if she wanted to speak to Dr Jeffers she nodded her head and said yes. Suspecting psychogenic component. ?Conversion disorder    Ordering routinary labs. Primary team to follow up in AM. Consider psych consultation.   after 30 min pt is wide awake .....possible absence seizure

## 2019-12-11 NOTE — PROGRESS NOTE ADULT - SUBJECTIVE AND OBJECTIVE BOX
Patient denies CP, SOB, events noted had a recurrent episode of unresponsiveness.  Review of systems otherwise (-)    acetaminophen   Tablet .. 650 milliGRAM(s) Oral every 6 hours PRN  aluminum hydroxide/magnesium hydroxide/simethicone Suspension 30 milliLiter(s) Oral every 6 hours PRN  aspirin  chewable 81 milliGRAM(s) Oral daily  clonazePAM  Tablet 0.5 milliGRAM(s) Oral two times a day  clotrimazole 1% Cream 1 Application(s) Topical two times a day  enoxaparin Injectable 40 milliGRAM(s) SubCutaneous daily  levothyroxine 50 MICROGram(s) Oral daily  OLANZapine 15 milliGRAM(s) Oral at bedtime  pantoprazole    Tablet 40 milliGRAM(s) Oral before breakfast  valproic  acid Syrup 250 milliGRAM(s) Oral daily                            12.1   7.18  )-----------( 224      ( 11 Dec 2019 06:25 )             37.4       Hemoglobin: 12.1 g/dL (12-11 @ 06:25)  Hemoglobin: 11.1 g/dL (12-09 @ 07:17)  Hemoglobin: 11.7 g/dL (12-08 @ 07:40)  Hemoglobin: 11.7 g/dL (12-07 @ 14:05)      12-11    142  |  110<H>  |  19<H>  ----------------------------<  87  4.3   |  28  |  0.60    Ca    8.9      11 Dec 2019 06:25  Phos  2.9     12-11  Mg     2.0     12-11    TPro  7.1  /  Alb  3.3<L>  /  TBili  0.4  /  DBili  x   /  AST  17  /  ALT  29  /  AlkPhos  105  12-11    Creatinine Trend: 0.60<--, 0.75<--, 0.55<--, 0.63<--    COAGS:     CARDIAC MARKERS ( 11 Dec 2019 06:25 )  <0.015 ng/mL / x     / x     / x     / x            T(C): 36.2 (12-11-19 @ 08:11), Max: 36.7 (12-10-19 @ 15:54)  HR: 70 (12-11-19 @ 08:11) (69 - 101)  BP: 119/73 (12-11-19 @ 08:11) (98/56 - 126/63)  RR: 17 (12-11-19 @ 08:11) (17 - 18)  SpO2: 98% (12-11-19 @ 08:11) (96% - 100%)  Wt(kg): --    I&O's Summary    10 Dec 2019 07:01  -  11 Dec 2019 07:00  --------------------------------------------------------  IN: 225 mL / OUT: 1200 mL / NET: -975 mL          Gen: Appears well in NAD  HEENT:  (-)icterus (-)pallor  CV: N S1 S2 1/6 VICKI (+)2 Pulses B/l  Resp:  Clear to ausculatation B/L, normal effort  GI: (+) BS Soft, NT, ND  Lymph:  (-)Edema, (-)obvious lymphadenopathy  Skin: Warm to touch, Normal turgor  Psych: Appropriate mood and affect      TELEMETRY: 	OFF        ASSESSMENT/PLAN: 	69y  Female oming from [facility] living, wheel chair bound, with PMH of Schizoaffective disorder, MDD, AUREA, S/P Pacemaker placement ( date unknown to pt) came to the ED with complaints of pain at the Pacemaker implantation site since last night. She describes the pain to be a sharp 10/10, radiating towards L arm that woke her up from sleep last night.     - PPM site benign appearing  - dual chamber fx WNL  - No need for further inpatient cardiac work up.  - Monitor mental status ? psychogenic etiology  - D/C plan per fatimah Ponce MD, [facility]  BEEPER [phone]

## 2020-09-24 NOTE — ED ADULT NURSE REASSESSMENT NOTE - NS ED NURSE REASSESS COMMENT FT1
Health Maintenance Due   Topic Date Due   • Meningococcal Vaccine (1 - Risk start before 7 months 4-dose series) 1961   • Meningococcal Serogroup B Vaccine (1 of 4 - Increased Risk Bexsero 2-dose series) 07/08/1973   • DTaP/Tdap/Td Vaccine (2 - Td) 08/16/2020   • Influenza Vaccine (1) 09/01/2020   • Diabetes Eye Exam  10/16/2020   • Diabetes A1C  11/18/2020       Patient is due for topics listed above, she wishes to proceed with Immunization(s) Influenza, but is not proceeding with Immunization(s) Dtap/Tdap/Td, Meningococcal and Meningococcal Serogroup B at this time. The following has occured:            Patient received in stable condition and hemodynamically stable . Patient sleeping comfortably easily arousable to verbal and tactile stimuli. Patient denies any medical complaints.

## 2020-12-23 NOTE — PATIENT PROFILE ADULT - SW.
Problem: Pain:  Goal: Pain level will decrease  Description: Pain level will decrease  Outcome: Ongoing  Note: Pt encouraged to use call light to notify staff when pain rises beyond tolerable. Pt educated on pain scale and was using call light appropriately. Pt reports pain minimal @ this time, unless moving in and out of bed. Appears comfortable, resting w eyes closed. Will cont to monitor     Problem: OXYGENATION/RESPIRATORY FUNCTION  Goal: Patient will achieve/maintain normal respiratory rate/effort  Outcome: Ongoing  Note: Pt remains on RA, sats w/in normal range. Denies SOB. Lung sounds clear. Pt using IS @ bedside when awake. Problem: MOBILITY  Goal: Early mobilization is achieved  Outcome: Ongoing  Note: Pt encouraged OOB as tolerated, continues to get in and out of bed several times w steady gait. social work

## 2021-08-23 NOTE — ED PROVIDER NOTE - ENMT, MLM
-- DO NOT REPLY / DO NOT REPLY ALL --  -- Message is from the [facility]--    General Patient Message      Reason for Call: patient's mother says she is returning two calls on behalf of the patient.    Caller Information       Type Contact Phone    08/23/2021 02:45 PM CDT Phone (Incoming) Burtonrajeev HuertaLisbeth (Mother) [phone]          Alternative phone number: none    Turnaround time given to caller:   \"This message will be sent to [state Provider's name]. The clinical team will fulfill your request as soon as they review your message.\"     Airway patent, Nasal mucosa clear. Mouth with normal mucosa. Throat has no vesicles, no oropharyngeal exudates and uvula is midline.

## 2023-01-18 NOTE — DISCHARGE NOTE ADULT - PROVIDER RX CONTACT NUMBER
ACC will update the plan once a new date is set for procedure.    Shirley Cruz RN    [facility]ticoagulation [facility]      [phone]

## 2023-03-23 NOTE — PATIENT PROFILE ADULT - BRADEN ACTIVITY
(1) bedfast no loss of consciousness, no gait abnormality, no headache, no sensory deficits, and no weakness.

## 2023-10-11 ENCOUNTER — TRANSCRIPTION ENCOUNTER (OUTPATIENT)
Age: 73
End: 2023-10-11

## 2023-10-11 ENCOUNTER — INPATIENT (INPATIENT)
Facility: HOSPITAL | Age: 73
LOS: 0 days | Discharge: NURSING, REHAB OR LONG-TERM CARE | DRG: 259 | End: 2023-10-12
Attending: INTERNAL MEDICINE | Admitting: INTERNAL MEDICINE
Payer: COMMERCIAL

## 2023-10-11 VITALS — DIASTOLIC BLOOD PRESSURE: 67 MMHG | RESPIRATION RATE: 16 BRPM | SYSTOLIC BLOOD PRESSURE: 132 MMHG | HEART RATE: 78 BPM

## 2023-10-11 DIAGNOSIS — G31.84 MILD COGNITIVE IMPAIRMENT OF UNCERTAIN OR UNKNOWN ETIOLOGY: ICD-10-CM

## 2023-10-11 DIAGNOSIS — Z95.0 PRESENCE OF CARDIAC PACEMAKER: Chronic | ICD-10-CM

## 2023-10-11 LAB
ANION GAP SERPL CALC-SCNC: 16 MMOL/L — SIGNIFICANT CHANGE UP (ref 5–17)
BUN SERPL-MCNC: 15 MG/DL — SIGNIFICANT CHANGE UP (ref 7–23)
CALCIUM SERPL-MCNC: 9.6 MG/DL — SIGNIFICANT CHANGE UP (ref 8.4–10.5)
CHLORIDE SERPL-SCNC: 102 MMOL/L — SIGNIFICANT CHANGE UP (ref 96–108)
CO2 SERPL-SCNC: 21 MMOL/L — LOW (ref 22–31)
CREAT SERPL-MCNC: 0.5 MG/DL — SIGNIFICANT CHANGE UP (ref 0.5–1.3)
EGFR: 101 ML/MIN/1.73M2 — SIGNIFICANT CHANGE UP
GLUCOSE SERPL-MCNC: 90 MG/DL — SIGNIFICANT CHANGE UP (ref 70–99)
HCT VFR BLD CALC: 36.9 % — SIGNIFICANT CHANGE UP (ref 34.5–45)
HGB BLD-MCNC: 12.1 G/DL — SIGNIFICANT CHANGE UP (ref 11.5–15.5)
MCHC RBC-ENTMCNC: 31.1 PG — SIGNIFICANT CHANGE UP (ref 27–34)
MCHC RBC-ENTMCNC: 32.8 GM/DL — SIGNIFICANT CHANGE UP (ref 32–36)
MCV RBC AUTO: 94.9 FL — SIGNIFICANT CHANGE UP (ref 80–100)
NRBC # BLD: 0 /100 WBCS — SIGNIFICANT CHANGE UP (ref 0–0)
PLATELET # BLD AUTO: 326 K/UL — SIGNIFICANT CHANGE UP (ref 150–400)
POTASSIUM SERPL-MCNC: 5 MMOL/L — SIGNIFICANT CHANGE UP (ref 3.5–5.3)
POTASSIUM SERPL-SCNC: 5 MMOL/L — SIGNIFICANT CHANGE UP (ref 3.5–5.3)
RBC # BLD: 3.89 M/UL — SIGNIFICANT CHANGE UP (ref 3.8–5.2)
RBC # FLD: 13.8 % — SIGNIFICANT CHANGE UP (ref 10.3–14.5)
SODIUM SERPL-SCNC: 139 MMOL/L — SIGNIFICANT CHANGE UP (ref 135–145)
WBC # BLD: 10.08 K/UL — SIGNIFICANT CHANGE UP (ref 3.8–10.5)
WBC # FLD AUTO: 10.08 K/UL — SIGNIFICANT CHANGE UP (ref 3.8–10.5)

## 2023-10-11 PROCEDURE — 93010 ELECTROCARDIOGRAM REPORT: CPT | Mod: 76

## 2023-10-11 RX ORDER — VALPROIC ACID (AS SODIUM SALT) 250 MG/5ML
30 SOLUTION, ORAL ORAL
Qty: 0 | Refills: 0 | DISCHARGE

## 2023-10-11 RX ORDER — LEVOTHYROXINE SODIUM 125 MCG
50 TABLET ORAL DAILY
Refills: 0 | Status: DISCONTINUED | OUTPATIENT
Start: 2023-10-11 | End: 2023-10-12

## 2023-10-11 RX ORDER — OLANZAPINE 15 MG/1
15 TABLET, FILM COATED ORAL DAILY
Refills: 0 | Status: DISCONTINUED | OUTPATIENT
Start: 2023-10-11 | End: 2023-10-12

## 2023-10-11 RX ORDER — POLYETHYLENE GLYCOL 3350 17 G/17G
17 POWDER, FOR SOLUTION ORAL DAILY
Refills: 0 | Status: DISCONTINUED | OUTPATIENT
Start: 2023-10-11 | End: 2023-10-12

## 2023-10-11 RX ORDER — OLANZAPINE 15 MG/1
1 TABLET, FILM COATED ORAL
Qty: 0 | Refills: 0 | DISCHARGE

## 2023-10-11 RX ORDER — PANTOPRAZOLE SODIUM 20 MG/1
40 TABLET, DELAYED RELEASE ORAL
Refills: 0 | Status: DISCONTINUED | OUTPATIENT
Start: 2023-10-11 | End: 2023-10-12

## 2023-10-11 RX ORDER — CEFAZOLIN SODIUM 1 G
2000 VIAL (EA) INJECTION EVERY 8 HOURS
Refills: 0 | Status: DISCONTINUED | OUTPATIENT
Start: 2023-10-11 | End: 2023-10-11

## 2023-10-11 RX ORDER — POLYETHYLENE GLYCOL 3350 17 G/17G
17 POWDER, FOR SOLUTION ORAL
Refills: 0 | DISCHARGE

## 2023-10-11 RX ORDER — ASPIRIN/CALCIUM CARB/MAGNESIUM 324 MG
1 TABLET ORAL
Qty: 0 | Refills: 0 | DISCHARGE

## 2023-10-11 RX ORDER — OMEPRAZOLE 10 MG/1
1 CAPSULE, DELAYED RELEASE ORAL
Qty: 0 | Refills: 0 | DISCHARGE

## 2023-10-11 RX ORDER — CEFAZOLIN SODIUM 1 G
2000 VIAL (EA) INJECTION ONCE
Refills: 0 | Status: COMPLETED | OUTPATIENT
Start: 2023-10-11 | End: 2023-10-11

## 2023-10-11 RX ORDER — VALPROIC ACID (AS SODIUM SALT) 250 MG/5ML
250 SOLUTION, ORAL ORAL DAILY
Refills: 0 | Status: DISCONTINUED | OUTPATIENT
Start: 2023-10-11 | End: 2023-10-12

## 2023-10-11 RX ORDER — OMEPRAZOLE 10 MG/1
1 CAPSULE, DELAYED RELEASE ORAL
Refills: 0 | DISCHARGE

## 2023-10-11 RX ORDER — CEFAZOLIN SODIUM 1 G
2000 VIAL (EA) INJECTION EVERY 8 HOURS
Refills: 0 | Status: COMPLETED | OUTPATIENT
Start: 2023-10-11 | End: 2023-10-12

## 2023-10-11 RX ORDER — CLONAZEPAM 1 MG
0.5 TABLET ORAL
Refills: 0 | Status: DISCONTINUED | OUTPATIENT
Start: 2023-10-11 | End: 2023-10-12

## 2023-10-11 RX ORDER — ACETAMINOPHEN 500 MG
0 TABLET ORAL
Qty: 0 | Refills: 0 | DISCHARGE

## 2023-10-11 RX ORDER — CLONAZEPAM 1 MG
1 TABLET ORAL
Qty: 0 | Refills: 0 | DISCHARGE

## 2023-10-11 RX ADMIN — Medication 100 MILLIGRAM(S): at 22:34

## 2023-10-11 RX ADMIN — Medication 250 MILLIGRAM(S): at 18:13

## 2023-10-11 RX ADMIN — OLANZAPINE 15 MILLIGRAM(S): 15 TABLET, FILM COATED ORAL at 18:14

## 2023-10-11 NOTE — PRE-ANESTHESIA EVALUATION ADULT - NSANTHAIRWAYFT_ENT_ALL_CORE
Patient poorly cooperative with exam   Groslly free neck ROM  Small mouth opening   TM distance ~2FB

## 2023-10-11 NOTE — PRE-ANESTHESIA EVALUATION ADULT - NSANTHPMHFT_GEN_ALL_CORE
72F with [facility] resident with HTN, SSS (PPM in situ), COPD, CKD, GERD, hypothyroidism, cognitive dysfunction, schizoaffective disorder, bipolar disorder, anxiety/ depression presents today for PPM generator change after having an abnormal EKG that was consistent with her PPM reaching BOBBY. She is unable to provide a medical hx and consent the above was copied from Alondra's note dated 9/11/23.

## 2023-10-11 NOTE — PROGRESS NOTE ADULT - SUBJECTIVE AND OBJECTIVE BOX
EP Brief Operative Note    Diagnosis: SSS  Procedure: dual chamber PPM gen change  Surgeon: Odalys Lara M.D.  Findings: none  EBL: minimal  Specimens: none  Post-op Diagnosis: same  Assistants: none      A/P) s/p successful dual chamber PPM gen change, no acute complications    -ancef 2g iv q8h x 2 more doses  -resume all home meds  -cardiology consult dr doshi  -medicine consult dr taylor  -expect d/c home tomorrow   -f/u with me for wound check on 10/19 at 1220pm  -f/u with [facility] within 2-3 weeks      Odalys Lara M.D., [facility]  Cardiac Electrophysiology  [facility]  [address] [place] [address], [place] [address]  [email or web address]    office [phone]  pager [phone]

## 2023-10-11 NOTE — PATIENT PROFILE ADULT - FLU SEASON?
Detail Level: Detailed Otc Regimen: Elevate feet with a pillow while sleeping \\n\\nWear compression stockings daily Yes...

## 2023-10-11 NOTE — PATIENT PROFILE ADULT - FOOD INSECURITY
Scribe Attestation (For Scribes USE Only)... Attending Attestation (For Attendings USE Only).../Scribe Attestation (For Scribes USE Only)... no

## 2023-10-11 NOTE — H&P CARDIOLOGY - HISTORY OF PRESENT ILLNESS
71 yo F from [facility] PMhx cognitive dysfunction,  hypothyroid, schizoaffective disorder, bipolar disorder, anxiety/ depression, GERD, COPD, hyperlipidemia, severe CKD, HTN,  presents today for PPM generator change , after having an abnormal EKG that was consistent with her PPM reaching BOBBY. The original PPM was MDT implanted in 2004, in 2010 she underwent a pulse generator change, in Feb 2023 PPM reached BOBBY and switched to emergency  VVI 65 bpm mode. Upon f/u with Dr Lara the PPM does not permit change in parameter therefore he was unable to check the underlying conduction . She is not PPM dependent and the indication for implant was likely  SSS .     She is unable to provide a medical hx and consent the above was copied from Alondra's note dated [id number].    next of kin (brother) Marky [phone] 73 yo F from [facility] PMhx cognitive dysfunction,  hypothyroid, schizoaffective disorder, bipolar disorder, anxiety/ depression, GERD, COPD, hyperlipidemia, severe CKD, HTN,  presents today for PPM generator change , after having an abnormal EKG that was consistent with her PPM reaching BOBBY. The original PPM was MDT implanted in 2004, in 2010 she underwent a pulse generator change, in Feb 2023 PPM reached BOBBY and switched to emergency  VVI 65 bpm mode. Upon f/u with Dr Lara the PPM does not permit change in parameter therefore he was unable to check the underlying conduction . She is not PPM dependent and the indication for implant was likely  SSS .     She is unable to provide a medical hx and consent the above was copied from Alondra's note dated 9/11/23.    next of kin (brother) Marky [phone]

## 2023-10-11 NOTE — ASU DISCHARGE PLAN (ADULT/PEDIATRIC) - CARE PROVIDER_API CALL
Odalys Lara  Cardiovascular Disease  [address]  [place] [address]  Phone: [phone]  Fax: [phone]  Established Patient  Follow Up Time: 2 weeks

## 2023-10-11 NOTE — PATIENT PROFILE ADULT - FALL HARM RISK - HARM RISK INTERVENTIONS

## 2023-10-11 NOTE — ASU DISCHARGE PLAN (ADULT/PEDIATRIC) - NS MD DC FALL RISK RISK
For information on Fall & Injury Prevention, visit: [email or web address] OR  [email or web address] OR  https://www.cdc.gov/steadi/patient.html

## 2023-10-12 ENCOUNTER — TRANSCRIPTION ENCOUNTER (OUTPATIENT)
Age: 73
End: 2023-10-12

## 2023-10-12 VITALS
TEMPERATURE: 98 F | HEART RATE: 73 BPM | RESPIRATION RATE: 18 BRPM | SYSTOLIC BLOOD PRESSURE: 112 MMHG | DIASTOLIC BLOOD PRESSURE: 46 MMHG | OXYGEN SATURATION: 95 %

## 2023-10-12 LAB
HCT VFR BLD CALC: 32 % — LOW (ref 34.5–45)
HGB BLD-MCNC: 10.1 G/DL — LOW (ref 11.5–15.5)
MCHC RBC-ENTMCNC: 30.1 PG — SIGNIFICANT CHANGE UP (ref 27–34)
MCHC RBC-ENTMCNC: 31.6 GM/DL — LOW (ref 32–36)
MCV RBC AUTO: 95.5 FL — SIGNIFICANT CHANGE UP (ref 80–100)
NRBC # BLD: 0 /100 WBCS — SIGNIFICANT CHANGE UP (ref 0–0)
PLATELET # BLD AUTO: 254 K/UL — SIGNIFICANT CHANGE UP (ref 150–400)
RBC # BLD: 3.35 M/UL — LOW (ref 3.8–5.2)
RBC # FLD: 13.8 % — SIGNIFICANT CHANGE UP (ref 10.3–14.5)
WBC # BLD: 6.22 K/UL — SIGNIFICANT CHANGE UP (ref 3.8–10.5)
WBC # FLD AUTO: 6.22 K/UL — SIGNIFICANT CHANGE UP (ref 3.8–10.5)

## 2023-10-12 PROCEDURE — 85027 COMPLETE CBC AUTOMATED: CPT

## 2023-10-12 PROCEDURE — C1889: CPT

## 2023-10-12 PROCEDURE — C1785: CPT

## 2023-10-12 PROCEDURE — 36415 COLL VENOUS BLD VENIPUNCTURE: CPT

## 2023-10-12 PROCEDURE — 93010 ELECTROCARDIOGRAM REPORT: CPT

## 2023-10-12 PROCEDURE — 80048 BASIC METABOLIC PNL TOTAL CA: CPT

## 2023-10-12 PROCEDURE — 33228 REMV&REPLC PM GEN DUAL LEAD: CPT

## 2023-10-12 PROCEDURE — 93005 ELECTROCARDIOGRAM TRACING: CPT

## 2023-10-12 RX ORDER — ACETAMINOPHEN 500 MG
1000 TABLET ORAL ONCE
Refills: 0 | Status: COMPLETED | OUTPATIENT
Start: 2023-10-12 | End: 2023-10-12

## 2023-10-12 RX ORDER — MORPHINE SULFATE 50 MG/1
2 CAPSULE, EXTENDED RELEASE ORAL ONCE
Refills: 0 | Status: DISCONTINUED | OUTPATIENT
Start: 2023-10-12 | End: 2023-10-12

## 2023-10-12 RX ADMIN — Medication 1000 MILLIGRAM(S): at 06:04

## 2023-10-12 RX ADMIN — Medication 0.5 MILLIGRAM(S): at 00:13

## 2023-10-12 RX ADMIN — Medication 1 TABLET(S): at 11:20

## 2023-10-12 RX ADMIN — Medication 250 MILLIGRAM(S): at 11:21

## 2023-10-12 RX ADMIN — OLANZAPINE 15 MILLIGRAM(S): 15 TABLET, FILM COATED ORAL at 11:21

## 2023-10-12 RX ADMIN — Medication 400 MILLIGRAM(S): at 05:34

## 2023-10-12 RX ADMIN — PANTOPRAZOLE SODIUM 40 MILLIGRAM(S): 20 TABLET, DELAYED RELEASE ORAL at 05:11

## 2023-10-12 RX ADMIN — Medication 0.5 MILLIGRAM(S): at 11:21

## 2023-10-12 RX ADMIN — Medication 100 MILLIGRAM(S): at 06:19

## 2023-10-12 RX ADMIN — Medication 50 MICROGRAM(S): at 05:11

## 2023-10-12 NOTE — CONSULT NOTE ADULT - SUBJECTIVE AND OBJECTIVE BOX
C A R D I O L O G Y  *********************    DATE OF SERVICE: 10-12-23    HISTORY OF PRESENT ILLNESS: HPI:  Patient is a 73 y/o Female from [facility] with PMH of cognitive dysfunction, SSS s/p PPM, hypothyroidism, schizoaffective disorder, bipolar disorder, anxiety/ depression, GERD, COPD, HTN, CKD, and hyperlipidemia who is admitted s/p PPM gen change. Denies chest pain, SOB, palpitations, dizziness, or syncope.    PAST MEDICAL & SURGICAL HISTORY:  Pacemaker      Bipolar Disorder      Hypercholesteremia      Hypothyroidism      Osteopenia      Schizophrenia      Paranoia  CHRONIC      COPD (chronic obstructive pulmonary disease)      GERD (gastroesophageal reflux disease)      Anemia      Cardiac pacemaker              MEDICATIONS:  MEDICATIONS  (STANDING):  clonazePAM  Tablet 0.5 milliGRAM(s) Oral two times a day  levothyroxine 50 MICROGram(s) Oral daily  multivitamin 1 Tablet(s) Oral daily  OLANZapine 15 milliGRAM(s) Oral daily  pantoprazole    Tablet 40 milliGRAM(s) Oral before breakfast  polyethylene glycol 3350 17 Gram(s) Oral daily  valproic  acid Syrup 250 milliGRAM(s) Oral daily      Allergies    shellfish (Unknown)  shellfish. (Hives; Urticaria)    Intolerances        FAMILY HISTORY:  No pertinent family history in first degree relatives      Non-contributary for premature coronary disease or sudden cardiac death    SOCIAL HISTORY:    [x ] Non-smoker  [ ] Smoker  [ ] Alcohol    FLU VACCINE THIS YEAR STARTS IN AUGUST:  [ ] Yes    [ ] No    IF OVER 65 HAVE YOU EVER HAD A PNA VACCINE:  [ ] Yes    [ ] No       [ ] N/A      REVIEW OF SYSTEMS:  [ ]chest pain  [  ]shortness of breath  [  ]palpitations  [  ]syncope  [ ]near syncope [ ]upper extremity weakness   [ ] lower extremity weakness  [  ]diplopia  [  ]altered mental status   [  ]fevers  [ ]chills [ ]nausea  [ ]vomiting  [  ]dysphagia    [ ]abdominal pain  [ ]melena  [ ]BRBPR    [  ]epistaxis  [  ]rash    [ ]lower extremity edema        [X] All others negative	  [ ] Unable to obtain      LABS:	 	    CARDIAC MARKERS:                              10.1   6.22  )-----------( 254      ( 12 Oct 2023 05:08 )             32.0     Hb Trend: 10.1<--, 12.1<--    10-11    139  |  102  |  15  ----------------------------<  90  5.0   |  21<L>  |  0.50    Ca    9.6      11 Oct 2023 11:43      Creatinine Trend: 0.50<--    Coags:      proBNP:   Lipid Profile:   HgA1c:   TSH:         PHYSICAL EXAM:  T(C): 36.6 (10-12-23 @ 08:24), Max: 36.8 (10-11-23 @ 11:23)  HR: 70 (10-12-23 @ 08:24) (60 - 80)  BP: 107/62 (10-12-23 @ 08:24) (85/50 - 132/67)  RR: 18 (10-12-23 @ 08:24) (14 - 25)  SpO2: 95% (10-12-23 @ 08:24) (92% - 97%)  Wt(kg): --   BMI (kg/m2): 192.8 (10-11-23 @ 15:54)  I&O's Summary    11 Oct 2023 07:01  -  12 Oct 2023 07:00  --------------------------------------------------------  IN: 0 mL / OUT: 225 mL / NET: -225 mL        Gen: NAD  HEENT:  (-)icterus (-)pallor  CV: N S1 S2 1/6 VICKI (+)2 Pulses B/l  Resp:  Clear to auscultation B/L, normal effort  GI: (+) BS Soft, NT, ND  Lymph:  (-)Edema, (-)obvious lymphadenopathy  Skin: Warm to touch, Normal turgor  Psych: Appropriate mood and affect      TELEMETRY: SR/AP 60-70	      ECG: NSR, LBBB - unchanged 	    ASSESSMENT/PLAN: Patient is a 73 y/o Female from [facility] with PMH of cognitive dysfunction, SSS s/p PPM, hypothyroidism, schizoaffective disorder, bipolar disorder, anxiety/ depression, GERD, COPD, HTN, CKD, and hyperlipidemia who is admitted s/p PPM gen change.    #PPM  - EP f/u appreciated s/p gen change  - Tolerated procedure well from CV perspective    #Hypothyroidism  - Continue Synthroid    - No further inpatient cardiac w/u planned - stable for discharge from CV perspective  - Patient to f/u with EP Dr. Lara for wound check on 10/19 at 1220pm  - Patient to f/u with cardiologist Dr. Richard within 2-3 weeks    Feroz Wharton PA-C  Pager: [phone]

## 2023-10-12 NOTE — CONSULT NOTE ADULT - ASSESSMENT
Agree with above assessment and plan as outlined above.    - tolerated procedure  - d/c planned for today    Fly Ponce MD, [facility]  [facility] [phone]

## 2023-10-12 NOTE — DISCHARGE NOTE PROVIDER - NSDCMRMEDTOKEN_GEN_ALL_CORE_FT
clonazePAM 0.5 mg oral tablet: 1 tab(s) orally 2 times a day  docusate sodium 100 mg oral capsule: 1 cap(s) orally 3 times a day  Multiple Vitamins oral tablet: 1 tab(s) orally once a day  OLANZapine 15 mg oral tablet: 1 tab(s) orally once a day  omeprazole 20 mg oral delayed release tablet: 1 tab(s) orally once a day  omeprazole 40 mg oral delayed release capsule: 1 cap(s) orally once a day  polyethylene glycol 3350 oral powder for reconstitution: 17 gram(s) orally once a day  Synthroid 50 mcg (0.05 mg) oral tablet: 1 tab(s) orally once a day  valproic acid 250 mg/5 mL oral syrup: 30 milliliter(s) orally once a day

## 2023-10-12 NOTE — DISCHARGE NOTE PROVIDER - HOSPITAL COURSE
HPI:  73 yo F from [facility] PMhx cognitive dysfunction,  hypothyroid, schizoaffective disorder, bipolar disorder, anxiety/ depression, GERD, COPD, hyperlipidemia, severe CKD, HTN,  presents today for PPM generator change , after having an abnormal EKG that was consistent with her PPM reaching BOBBY. The original PPM was MDT implanted in 2004, in 2010 she underwent a pulse generator change, in Feb 2023 PPM reached BOBBY and switched to emergency VVI 65 bpm mode. Upon f/u with Dr Lara the PPM does not permit change in parameter therefore he was unable to check the underlying conduction . She is not PPM dependent and the indication for implant was likely SSS .     She is unable to provide a medical hx and consent the above was copied from Alondra's note dated 9/11/23.    next of kin (brother) Marky [phone] (11 Oct 2023 10:54)    10/11 s/p PPM generator change. Left chest wall site without swelling, bleeding.

## 2023-10-12 NOTE — DISCHARGE NOTE PROVIDER - NSDCCPTREATMENT_GEN_ALL_CORE_FT
PRINCIPAL PROCEDURE  Procedure: Replacement of single lead pacemaker generator  Findings and Treatment: Pacemaker generator replaced

## 2023-10-12 NOTE — DISCHARGE NOTE PROVIDER - NSDCCPCAREPLAN_GEN_ALL_CORE_FT
PRINCIPAL DISCHARGE DIAGNOSIS  Diagnosis: Pacemaker generator end of life  Assessment and Plan of Treatment: Continue with follow-up visits to your electrophysiology team and with your home remote device monitoring (if applicable). Continue your medications as prescribed. Monitor your left chest wall site for swelling, bleeding.      SECONDARY DISCHARGE DIAGNOSES  Diagnosis: HTN (hypertension)  Assessment and Plan of Treatment: Continue with your blood pressure medications; eat a heart healthy diet with low salt diet; exercise regularly (consult with your physician or cardiologist first); maintain a heart healthy weight; if you smoke - quit (A resource to help you stop smoking is the [facility] – phone number [phone].); include healthy ways to manage stress. Continue to follow with your primary care physician or cardiologist.

## 2023-10-12 NOTE — DISCHARGE NOTE NURSING/CASE MANAGEMENT/SOCIAL WORK - NSDCPEFALRISK_GEN_ALL_CORE
For information on Fall & Injury Prevention, visit: [email or web address]-mobility OR  [email or web address]-injury OR  https://www.cdc.gov/steadi/patient.html

## 2023-10-12 NOTE — DISCHARGE NOTE NURSING/CASE MANAGEMENT/SOCIAL WORK - PATIENT PORTAL LINK FT
You can access the FollowMyHealth Patient Portal offered by [facility] by registering at the following website: [email or web address]. By joining [employer]’s FollowMyHealth portal, you will also be able to view your health information using other applications (apps) compatible with our system.

## 2023-10-12 NOTE — DISCHARGE NOTE PROVIDER - CARE PROVIDER_API CALL
Odalys Lara  Cardiovascular Disease  [address]  [place] [address]  Phone: [phone]  Fax: [phone]  Scheduled Appointment: 10/19/2023 12:20 PM

## 2024-08-23 NOTE — H&P ADULT - NSHPLANGLIMITEDENGLISH_GEN_A_CORE
Infusion Nursing Note:  Rosa Crandall presents today for Reclast.    Patient seen by provider today: No   present during visit today: Not Applicable.    Note: N/A.      Intravenous Access:  Peripheral IV placed.    Treatment Conditions:  Lab Results   Component Value Date     07/12/2024    POTASSIUM 4.2 07/12/2024    MAG 2.3 05/02/2022    CR 0.85 07/12/2024    IRIS 9.7 07/12/2024    BILITOTAL 0.9 07/12/2024    ALBUMIN 4.3 07/12/2024    ALT 22 07/12/2024    AST 32 07/12/2024       Results reviewed, labs MET treatment parameters, ok to proceed with treatment.      Post Infusion Assessment:  Patient tolerated infusion without incident.  Blood return noted pre and post infusion.  Site patent and intact, free from redness, edema or discomfort.  No evidence of extravasations.  Access discontinued per protocol.       Discharge Plan:   Discharge instructions reviewed with: Patient.  Patient and/or family verbalized understanding of discharge instructions and all questions answered.  Copy of AVS reviewed with patient and/or family.  Patient will call to schedule next appt.   Patient discharged in stable condition accompanied by: self.  Departure Mode: Ambulatory.      DANI Abdi     No

## 2025-04-03 NOTE — DIETITIAN INITIAL EVALUATION ADULT. - PROBLEM/PLAN-4
[FreeTextEntry1] : [facility] Dr Vernell [facility]AN- Dr Carmen SOARES- Dr Brambila Dental- Dr Lozano Orthodontist- Dr Dilshad Ferrara [address] [facility], [place] [address]  [phone] f - [phone] ([facility]) [phone]
DISPLAY PLAN FREE TEXT